# Patient Record
Sex: FEMALE | Race: WHITE | NOT HISPANIC OR LATINO | Employment: OTHER | URBAN - METROPOLITAN AREA
[De-identification: names, ages, dates, MRNs, and addresses within clinical notes are randomized per-mention and may not be internally consistent; named-entity substitution may affect disease eponyms.]

---

## 2023-06-20 ENCOUNTER — OFFICE VISIT (OUTPATIENT)
Dept: URGENT CARE | Facility: CLINIC | Age: 68
End: 2023-06-20
Payer: MEDICARE

## 2023-06-20 VITALS
TEMPERATURE: 97.9 F | HEIGHT: 62 IN | DIASTOLIC BLOOD PRESSURE: 80 MMHG | SYSTOLIC BLOOD PRESSURE: 132 MMHG | HEART RATE: 68 BPM | WEIGHT: 150 LBS | RESPIRATION RATE: 16 BRPM | OXYGEN SATURATION: 98 % | BODY MASS INDEX: 27.6 KG/M2

## 2023-06-20 DIAGNOSIS — M71.22 BAKER'S CYST OF KNEE, LEFT: ICD-10-CM

## 2023-06-20 DIAGNOSIS — M25.462 PAIN AND SWELLING OF LEFT KNEE: Primary | ICD-10-CM

## 2023-06-20 DIAGNOSIS — M25.562 PAIN AND SWELLING OF LEFT KNEE: Primary | ICD-10-CM

## 2023-06-20 PROCEDURE — 99203 OFFICE O/P NEW LOW 30 MIN: CPT | Performed by: PHYSICIAN ASSISTANT

## 2023-06-20 PROCEDURE — G0463 HOSPITAL OUTPT CLINIC VISIT: HCPCS | Performed by: PHYSICIAN ASSISTANT

## 2023-06-20 RX ORDER — MELOXICAM 15 MG/1
15 TABLET ORAL DAILY
Qty: 20 TABLET | Refills: 0 | Status: SHIPPED | OUTPATIENT
Start: 2023-06-20

## 2023-06-20 NOTE — PATIENT INSTRUCTIONS
Take anti-inflammatory as instructed  Take with food  May also take Tylenol in addition if needed  Neoprene knee wrap may be helpful  Would recommend that you apply it earlier in your day to try and help prevent increased pain swelling that has been building throughout the day  You may call the orthopedic scheduling office at [phone] to inquire about scheduling an appointment

## 2023-06-20 NOTE — PROGRESS NOTES
[facility]    NAME: Yenny Short is a 74 y o  female  : 1955    MRN: [id number]  DATE: 2023  TIME: 9:46 AM    Assessment and Plan   Pain and swelling of left knee [M25 562, M25 462]  1  Pain and swelling of left knee  meloxicam (Mobic) 15 mg tablet    Ambulatory referral to Orthopedic Surgery      2  Baker's cyst of knee, left  meloxicam (Mobic) 15 mg tablet    Ambulatory referral to Orthopedic Surgery        Patient informed that this could be degenerative changes of not only the cartilage but the meniscus  Patient deferring x-ray here today as no acute injury  If not improving may benefit from orthopedic evaluation  Patient Instructions   Patient Instructions   Take anti-inflammatory as instructed  Take with food  May also take Tylenol in addition if needed  Neoprene knee wrap may be helpful  Would recommend that you apply it earlier in your day to try and help prevent increased pain swelling that has been building throughout the day  You may call the orthopedic scheduling office at [phone] to inquire about scheduling an appointment  Chief Complaint     Chief Complaint   Patient presents with   • Knee Pain     Patient with left knee pain for 6 days  Lump to the posterior knee as well       History of Present Illness   Yenny Short presents to the clinic c/o  57-year-old female comes in with left knee pain  Started: Couple weeks ago but worse in the last few days  Associated signs and symptoms: Tenderness medial aspect  Feels like she is hyperextended it  Some locking and relief after pops her knee  Also bulging fullness posterior aspect of knee  Modifying factors: Compresses and rest     History of stomach ulcer diagnosed on EGD approximately 10 to 15 years ago  No bleeding  Tries to be careful with what she takes  Currently no primary care provider and is not interested in getting established    Would be interested in orthopedic referral if "needed  Review of Systems   Review of Systems   Constitutional: Positive for activity change  Negative for appetite change, chills and unexpected weight change  Musculoskeletal: Positive for arthralgias, gait problem and joint swelling  Skin: Negative for color change  Current Medications     Long-Term Medications   Medication Sig Dispense Refill   • meloxicam (Mobic) 15 mg tablet Take 1 tablet (15 mg total) by mouth daily 20 tablet 0       Current Allergies     Allergies as of 2023 - Reviewed 2023   Allergen Reaction Noted   • Shellfish-derived products - food allergy Swelling 2012          The following portions of the patient's history were reviewed and updated as appropriate: allergies, current medications, past family history, past medical history, past social history, past surgical history and problem list   History reviewed  No pertinent past medical history  Past Surgical History:   Procedure Laterality Date   •  SECTION       History reviewed  No pertinent family history  Objective   /80   Pulse 68   Temp 97 9 °F (36 6 °C) (Tympanic)   Resp 16   Ht 5' 2\" (1 575 m)   Wt 68 kg (150 lb)   SpO2 98%   BMI 27 44 kg/m²   No LMP recorded  Patient is postmenopausal        Physical Exam     Physical Exam  Vitals and nursing note reviewed  Constitutional:       General: She is not in acute distress  Appearance: Normal appearance  She is well-developed  She is not ill-appearing, toxic-appearing or diaphoretic  Cardiovascular:      Rate and Rhythm: Normal rate  Pulmonary:      Effort: Pulmonary effort is normal  No respiratory distress  Musculoskeletal:         General: Swelling and tenderness present  No deformity or signs of injury  Right knee: Crepitus present  No swelling, deformity, effusion, erythema or bony tenderness  Instability Tests: Anterior drawer test negative  Posterior drawer test negative  Anterior Lachman test negative   " Medial Leana test negative and lateral Leana test negative  Left knee: Effusion, bony tenderness and crepitus present  No deformity, ecchymosis or lacerations  Decreased range of motion  Tenderness present over the medial joint line  No lateral joint line, MCL, LCL, ACL, PCL or patellar tendon tenderness  Abnormal meniscus  Instability Tests: Anterior drawer test negative  Posterior drawer test negative  Anterior Lachman test negative  Medial Leana test positive  Lateral Leana test negative  Neurological:      Mental Status: She is alert and oriented to person, place, and time     Psychiatric:         Mood and Affect: Mood normal          Behavior: Behavior normal

## 2023-07-04 ENCOUNTER — OFFICE VISIT (OUTPATIENT)
Dept: URGENT CARE | Facility: MEDICAL CENTER | Age: 68
End: 2023-07-04
Payer: MEDICARE

## 2023-07-04 VITALS
TEMPERATURE: 100.7 F | HEIGHT: 63 IN | RESPIRATION RATE: 20 BRPM | SYSTOLIC BLOOD PRESSURE: 111 MMHG | WEIGHT: 154 LBS | OXYGEN SATURATION: 100 % | BODY MASS INDEX: 27.29 KG/M2 | HEART RATE: 107 BPM | DIASTOLIC BLOOD PRESSURE: 82 MMHG

## 2023-07-04 DIAGNOSIS — R42 DIZZINESS AND GIDDINESS: ICD-10-CM

## 2023-07-04 DIAGNOSIS — R11.0 NAUSEA: Primary | ICD-10-CM

## 2023-07-04 DIAGNOSIS — R50.9 FEVER, UNSPECIFIED FEVER CAUSE: ICD-10-CM

## 2023-07-04 DIAGNOSIS — M25.50 ARTHRALGIA, UNSPECIFIED JOINT: ICD-10-CM

## 2023-07-04 PROCEDURE — 99213 OFFICE O/P EST LOW 20 MIN: CPT

## 2023-07-04 PROCEDURE — G0463 HOSPITAL OUTPT CLINIC VISIT: HCPCS

## 2023-07-04 RX ORDER — ONDANSETRON 4 MG/1
4 TABLET, FILM COATED ORAL EVERY 8 HOURS PRN
Qty: 20 TABLET | Refills: 0 | Status: SHIPPED | OUTPATIENT
Start: 2023-07-04 | End: 2023-07-05 | Stop reason: ALTCHOICE

## 2023-07-04 NOTE — PROGRESS NOTES
[facility]        NAME: Manny Mckee is a 79 y.o. female  : 1955    MRN: [id number]  DATE: 2023  TIME: 10:20 AM    Assessment and Plan   Nausea [R11.0]  1. Nausea  ondansetron (ZOFRAN) 4 mg tablet      2. Arthralgia, unspecified joint        3. Fever, unspecified fever cause        4. Dizziness and giddiness          No presence of erythema migrans on examination. Constitutional symptoms present. Advised patient that her symptoms are common in a multitude of illnesses/diseases and it is important for her to establish care with a PCP for further workup. PCP handout provided. Zofran prescribed for nausea. Patient Instructions     Your symptoms are common in many illnesses/diseases. It is important to follow up with a PCP for further testing to determine the underlying cause - a PCP handout was provided. For now, treat symptomatically - antipyretics/pain medications (avoid other NSAIDs with meloxicam use, may use Tylenol with meloxicam) to reduce fever and help with the joint pain. Drink plenty of fluids, rest. Follow a bland diet until nausea resolves. Zofran prescribed, use as directed. Proceed to  ER if symptoms worsen. Chief Complaint     Chief Complaint   Patient presents with   • Joint Pain     Pt presents with sharp stabbing pain in all joints starting yesterday. Began with knee pain ~ 1 month ago. Seen at [facility] and given Meloxicam(minimal relief)  Aldos c/o fever, nausea and dizziness. Has hx of lyme's disease X 2 and reports having same sx. Pulled tic off abdomen ~ 1 month ago and reports not having bull's eye rash. History of Present Illness       Patient presents with complaints of joint pain (stabbing pain in all joints in the past 3 days; also left knee pain for approximately one month - seen at [facility] prescribed meloxicam with minimal relief), bilateral hands falling asleep, fever, nausea and dizziness.  Besides meloxicam, has not tried anything else for symptoms. Of note, she states she pulled a tick off of her abdomen about one month ago. Denies rash. States she has had Lyme disease twice in the past with similar symptoms. Review of Systems   Review of Systems   Constitutional: Positive for appetite change (decreased), chills, fatigue and fever (tmax at home 100.5). HENT: Negative for congestion, rhinorrhea and sore throat. Respiratory: Negative for cough. Gastrointestinal: Positive for nausea. Negative for abdominal pain, constipation, diarrhea and vomiting. Musculoskeletal: Positive for arthralgias and joint swelling (left knee). Skin: Negative for rash. Neurological: Positive for dizziness and headaches. Current Medications       Current Outpatient Medications:   •  ondansetron (ZOFRAN) 4 mg tablet, Take 1 tablet (4 mg total) by mouth every 8 (eight) hours as needed for nausea or vomiting, Disp: 20 tablet, Rfl: 0  •  meloxicam (Mobic) 15 mg tablet, Take 1 tablet (15 mg total) by mouth daily, Disp: 20 tablet, Rfl: 0    Current Allergies     Allergies as of 2023 - Reviewed 2023   Allergen Reaction Noted   • Shellfish-derived products - food allergy Swelling 2012            The following portions of the patient's history were reviewed and updated as appropriate: allergies, current medications, past family history, past medical history, past social history, past surgical history and problem list.     History reviewed. No pertinent past medical history. Past Surgical History:   Procedure Laterality Date   •  SECTION         History reviewed. No pertinent family history. Medications have been verified.         Objective   /82 (BP Location: Left arm, Patient Position: Sitting, Cuff Size: Standard)   Pulse (!) 107   Temp (!) 100.7 °F (38.2 °C) (Tympanic)   Resp 20   Ht 5' 3" (1.6 m)   Wt 69.9 kg (154 lb)   SpO2 100%   BMI 27.28 kg/m²        Physical Exam     Physical Exam  Vitals and nursing note reviewed. Constitutional:       General: She is not in acute distress. Appearance: Normal appearance. She is not ill-appearing. Cardiovascular:      Rate and Rhythm: Normal rate and regular rhythm. Pulses: Normal pulses. Heart sounds: Normal heart sounds. Pulmonary:      Effort: Pulmonary effort is normal.      Breath sounds: Normal breath sounds. Abdominal:      General: Abdomen is flat. Bowel sounds are normal. There is no distension. Palpations: Abdomen is soft. Tenderness: There is no abdominal tenderness. There is no guarding. Musculoskeletal:      Right shoulder: No swelling, deformity, tenderness or bony tenderness. Normal range of motion. Left shoulder: No swelling, deformity, tenderness or bony tenderness. Normal range of motion. Right wrist: No swelling, deformity, tenderness or bony tenderness. Normal range of motion. Normal pulse. Left wrist: No swelling, deformity, tenderness or bony tenderness. Normal range of motion. Normal pulse. Right knee: No swelling, deformity or bony tenderness. Normal range of motion. No tenderness. Left knee: Swelling present. No deformity or bony tenderness. Normal range of motion. No tenderness. Comments: Negative Tinel's, Phalen's. Skin:     General: Skin is warm and dry. Findings: No erythema or rash. Neurological:      Mental Status: She is alert.

## 2023-07-04 NOTE — PATIENT INSTRUCTIONS
Your symptoms are common in many illnesses/diseases. It is important to follow up with a PCP for further testing to determine the underlying cause - a PCP handout was provided. For now, treat symptomatically - antipyretics/pain medications (avoid other NSAIDs with meloxicam use, may use Tylenol with meloxicam) to reduce fever and help with the joint pain. Drink plenty of fluids, rest. Follow a bland diet until nausea resolves. Zofran prescribed, use as directed. Proceed to  ER if symptoms worsen.

## 2023-07-05 ENCOUNTER — HOSPITAL ENCOUNTER (EMERGENCY)
Facility: HOSPITAL | Age: 68
Discharge: HOME | End: 2023-07-05
Attending: EMERGENCY MEDICINE
Payer: MEDICARE

## 2023-07-05 VITALS
TEMPERATURE: 99.8 F | RESPIRATION RATE: 18 BRPM | HEART RATE: 83 BPM | OXYGEN SATURATION: 98 % | DIASTOLIC BLOOD PRESSURE: 86 MMHG | SYSTOLIC BLOOD PRESSURE: 133 MMHG | BODY MASS INDEX: 27.3 KG/M2 | WEIGHT: 154.1 LBS

## 2023-07-05 DIAGNOSIS — D69.6 THROMBOCYTOPENIA (HCC): ICD-10-CM

## 2023-07-05 DIAGNOSIS — R11.0 NAUSEA: ICD-10-CM

## 2023-07-05 DIAGNOSIS — E87.1 HYPONATREMIA: ICD-10-CM

## 2023-07-05 DIAGNOSIS — M79.10 MYALGIA: Primary | ICD-10-CM

## 2023-07-05 DIAGNOSIS — M25.50 JOINT PAIN: ICD-10-CM

## 2023-07-05 DIAGNOSIS — R51.9 HEADACHE: ICD-10-CM

## 2023-07-05 LAB
ALBUMIN SERPL BCP-MCNC: 4.1 G/DL (ref 3.5–5)
ALP SERPL-CCNC: 55 U/L (ref 34–104)
ALT SERPL W P-5'-P-CCNC: 21 U/L (ref 7–52)
ANION GAP SERPL CALCULATED.3IONS-SCNC: 9 MMOL/L
AST SERPL W P-5'-P-CCNC: 44 U/L (ref 13–39)
ATRIAL RATE: 94 BPM
B BURGDOR IGG+IGM SER-ACNC: >8 AI
BASOPHILS # BLD MANUAL: 0 THOUSAND/UL (ref 0–0.1)
BASOPHILS NFR MAR MANUAL: 0 % (ref 0–1)
BILIRUB SERPL-MCNC: 0.58 MG/DL (ref 0.2–1)
BUN SERPL-MCNC: 17 MG/DL (ref 5–25)
CALCIUM SERPL-MCNC: 8.9 MG/DL (ref 8.4–10.2)
CARDIAC TROPONIN I PNL SERPL HS: 5 NG/L
CHLORIDE SERPL-SCNC: 95 MMOL/L (ref 96–108)
CO2 SERPL-SCNC: 23 MMOL/L (ref 21–32)
CREAT SERPL-MCNC: 0.78 MG/DL (ref 0.6–1.3)
EOSINOPHIL # BLD MANUAL: 0 THOUSAND/UL (ref 0–0.4)
EOSINOPHIL NFR BLD MANUAL: 0 % (ref 0–6)
ERYTHROCYTE [DISTWIDTH] IN BLOOD BY AUTOMATED COUNT: 13.1 % (ref 11.6–15.1)
GFR SERPL CREATININE-BSD FRML MDRD: 79 ML/MIN/1.73SQ M
GLUCOSE SERPL-MCNC: 88 MG/DL (ref 65–140)
HCT VFR BLD AUTO: 41.7 % (ref 34.8–46.1)
HGB BLD-MCNC: 13.6 G/DL (ref 11.5–15.4)
LYMPHOCYTES # BLD AUTO: 0.38 THOUSAND/UL (ref 0.6–4.47)
LYMPHOCYTES # BLD AUTO: 12 % (ref 14–44)
MCH RBC QN AUTO: 28.9 PG (ref 26.8–34.3)
MCHC RBC AUTO-ENTMCNC: 32.6 G/DL (ref 31.4–37.4)
MCV RBC AUTO: 89 FL (ref 82–98)
MONOCYTES # BLD AUTO: 0.1 THOUSAND/UL (ref 0–1.22)
MONOCYTES NFR BLD: 3 % (ref 4–12)
NEUTROPHILS # BLD MANUAL: 2.69 THOUSAND/UL (ref 1.85–7.62)
NEUTS BAND NFR BLD MANUAL: 16 % (ref 0–8)
NEUTS SEG NFR BLD AUTO: 69 % (ref 43–75)
P AXIS: 73 DEGREES
PLATELET # BLD AUTO: 73 THOUSANDS/UL (ref 149–390)
PLATELET BLD QL SMEAR: ABNORMAL
PMV BLD AUTO: 8.7 FL (ref 8.9–12.7)
POTASSIUM SERPL-SCNC: 3.5 MMOL/L (ref 3.5–5.3)
PR INTERVAL: 154 MS
PROT SERPL-MCNC: 7.1 G/DL (ref 6.4–8.4)
QRS AXIS: 7 DEGREES
QRSD INTERVAL: 88 MS
QT INTERVAL: 352 MS
QTC INTERVAL: 440 MS
RBC # BLD AUTO: 4.7 MILLION/UL (ref 3.81–5.12)
RBC MORPH BLD: NORMAL
SODIUM SERPL-SCNC: 127 MMOL/L (ref 135–147)
T WAVE AXIS: 63 DEGREES
VENTRICULAR RATE: 94 BPM
WBC # BLD AUTO: 3.17 THOUSAND/UL (ref 4.31–10.16)

## 2023-07-05 PROCEDURE — 85027 COMPLETE CBC AUTOMATED: CPT | Performed by: PHYSICIAN ASSISTANT

## 2023-07-05 PROCEDURE — 86617 LYME DISEASE ANTIBODY: CPT | Performed by: PHYSICIAN ASSISTANT

## 2023-07-05 PROCEDURE — 36415 COLL VENOUS BLD VENIPUNCTURE: CPT | Performed by: PHYSICIAN ASSISTANT

## 2023-07-05 PROCEDURE — 85007 BL SMEAR W/DIFF WBC COUNT: CPT | Performed by: PHYSICIAN ASSISTANT

## 2023-07-05 PROCEDURE — 93005 ELECTROCARDIOGRAM TRACING: CPT

## 2023-07-05 PROCEDURE — 80053 COMPREHEN METABOLIC PANEL: CPT | Performed by: PHYSICIAN ASSISTANT

## 2023-07-05 PROCEDURE — 93010 ELECTROCARDIOGRAM REPORT: CPT

## 2023-07-05 PROCEDURE — 84484 ASSAY OF TROPONIN QUANT: CPT | Performed by: PHYSICIAN ASSISTANT

## 2023-07-05 PROCEDURE — 86618 LYME DISEASE ANTIBODY: CPT | Performed by: PHYSICIAN ASSISTANT

## 2023-07-05 RX ORDER — SENNOSIDES 8.6 MG
650 CAPSULE ORAL EVERY 8 HOURS PRN
Qty: 30 TABLET | Refills: 0 | Status: SHIPPED | OUTPATIENT
Start: 2023-07-05

## 2023-07-05 RX ORDER — KETOROLAC TROMETHAMINE 30 MG/ML
15 INJECTION, SOLUTION INTRAMUSCULAR; INTRAVENOUS ONCE
Status: COMPLETED | OUTPATIENT
Start: 2023-07-05 | End: 2023-07-05

## 2023-07-05 RX ORDER — METOCLOPRAMIDE HYDROCHLORIDE 5 MG/ML
10 INJECTION INTRAMUSCULAR; INTRAVENOUS ONCE
Status: COMPLETED | OUTPATIENT
Start: 2023-07-05 | End: 2023-07-05

## 2023-07-05 RX ORDER — DIPHENHYDRAMINE HYDROCHLORIDE 50 MG/ML
25 INJECTION INTRAMUSCULAR; INTRAVENOUS ONCE
Status: COMPLETED | OUTPATIENT
Start: 2023-07-05 | End: 2023-07-05

## 2023-07-05 RX ORDER — DOXYCYCLINE HYCLATE 100 MG/1
100 CAPSULE ORAL 2 TIMES DAILY
Qty: 42 CAPSULE | Refills: 0 | Status: SHIPPED | OUTPATIENT
Start: 2023-07-05 | End: 2023-07-26

## 2023-07-05 RX ADMIN — DIPHENHYDRAMINE HYDROCHLORIDE 25 MG: 50 INJECTION, SOLUTION INTRAMUSCULAR; INTRAVENOUS at 11:43

## 2023-07-05 RX ADMIN — KETOROLAC TROMETHAMINE 15 MG: 30 INJECTION, SOLUTION INTRAMUSCULAR; INTRAVENOUS at 11:42

## 2023-07-05 RX ADMIN — SODIUM CHLORIDE 1000 ML: 0.9 INJECTION, SOLUTION INTRAVENOUS at 11:42

## 2023-07-05 RX ADMIN — METOCLOPRAMIDE 10 MG: 5 INJECTION, SOLUTION INTRAMUSCULAR; INTRAVENOUS at 11:43

## 2023-07-05 NOTE — DISCHARGE INSTRUCTIONS
Please refer to the attached information for strict return instructions. If symptoms worsen or new symptoms develop please return to the ER. Please follow up with your primary care physician for re-evaluation of symptom. Use prescribed antibiotics for full course pending changes by primary care physician.

## 2023-07-05 NOTE — ED PROVIDER NOTES
History  Chief Complaint   Patient presents with   • Generalized Body Aches     Pt c/o generalized body aches with headache nausea. Pt denies cp/sob/v/d. Pt is c/o dizziness. Pt states she has a Hx of lymes disease     Fahad Mendez is a 80 yo F presenting with waxing/waning joint paints over the past 2-3 weeks. Reports it began with pain in L knee and slight swelling - seen at urgent care, give meloxicam. She subsequently developed pain in R knee a few days later. The knee pain has since resolved. She did however develop pain in her hips bilaterally. She notes in the past week she developed generalized body aches, fatigue, nausea without vomiting, and headache. Low grade fever noted. The headache has been constant since onset, generalized, moderate in intensity. No neck pain/stiffness. Does report light sensitivity. She reports she had lyme disease about 20 years ago with similar symptoms. Reports living in the woods and spending a lot of time outdoors in the past 1-2 months. She believes she may have had a tick bite to R side of abdomen prior to onset of symptoms, although did not recall seeing tick specifically. No bulls-eye rash noted. History provided by:  Patient   used: No        None       Past Medical History:   Diagnosis Date   • Lyme disease        Past Surgical History:   Procedure Laterality Date   •  SECTION         History reviewed. No pertinent family history. I have reviewed and agree with the history as documented. E-Cigarette/Vaping   • E-Cigarette Use Never User      E-Cigarette/Vaping Substances     Social History     Tobacco Use   • Smoking status: Former     Types: Cigarettes   • Smokeless tobacco: Never   Vaping Use   • Vaping Use: Never used   Substance Use Topics   • Alcohol use: Yes     Comment: soc   • Drug use: Never       Review of Systems   Constitutional: Positive for fatigue and fever. Negative for chills.    HENT: Negative for congestion, rhinorrhea and sore throat. Eyes: Negative for pain and visual disturbance. Respiratory: Negative for cough, shortness of breath and wheezing. Cardiovascular: Negative for chest pain and palpitations. Gastrointestinal: Positive for nausea. Negative for abdominal pain, constipation, diarrhea and vomiting. Genitourinary: Negative for dysuria, frequency and urgency. Musculoskeletal: Positive for arthralgias and myalgias. Negative for back pain, neck pain and neck stiffness. Skin: Negative for rash and wound. Neurological: Positive for light-headedness and headaches. Negative for dizziness, weakness and numbness. Physical Exam  Physical Exam  Constitutional:       General: She is not in acute distress. Appearance: She is well-developed. She is not toxic-appearing or diaphoretic. HENT:      Head: Normocephalic and atraumatic. Right Ear: External ear normal.      Left Ear: External ear normal.   Eyes:      Conjunctiva/sclera: Conjunctivae normal.      Pupils: Pupils are equal, round, and reactive to light. Cardiovascular:      Rate and Rhythm: Normal rate and regular rhythm. Heart sounds: Normal heart sounds. No murmur heard. No friction rub. No gallop. Pulmonary:      Effort: Pulmonary effort is normal. No respiratory distress. Breath sounds: Normal breath sounds. No wheezing. Abdominal:      General: There is no distension. Palpations: Abdomen is soft. Tenderness: There is no abdominal tenderness. Musculoskeletal:      Cervical back: Normal range of motion and neck supple. Comments: Normal ROM bilateral upper and lower extremities without significant pain. No appreciable joint effusions or erythema/warmth. Normal ROM c-spine, no nuchal rigidity. Lymphadenopathy:      Cervical: No cervical adenopathy. Skin:     General: Skin is warm and dry. Capillary Refill: Capillary refill takes less than 2 seconds. Findings: No erythema or rash.    Neurological: Mental Status: She is alert and oriented to person, place, and time. Motor: No abnormal muscle tone. Coordination: Coordination normal.   Psychiatric:         Behavior: Behavior normal.         Thought Content:  Thought content normal.         Judgment: Judgment normal.         Vital Signs  ED Triage Vitals [07/05/23 1038]   Temperature Pulse Respirations Blood Pressure SpO2   99.8 °F (37.7 °C) (!) 110 18 142/83 99 %      Temp Source Heart Rate Source Patient Position - Orthostatic VS BP Location FiO2 (%)   Oral Monitor Lying Right arm --      Pain Score       8           Vitals:    07/05/23 1038 07/05/23 1334   BP: 142/83 133/86   Pulse: (!) 110 83   Patient Position - Orthostatic VS: Lying Lying         Visual Acuity  Visual Acuity    Flowsheet Row Most Recent Value   L Pupil Size (mm) 3   R Pupil Size (mm) 3          ED Medications  Medications   ketorolac (TORADOL) injection 15 mg (15 mg Intravenous Given 7/5/23 1142)   metoclopramide (REGLAN) injection 10 mg (10 mg Intravenous Given 7/5/23 1143)   diphenhydrAMINE (BENADRYL) injection 25 mg (25 mg Intravenous Given 7/5/23 1143)   sodium chloride 0.9 % bolus 1,000 mL (0 mL Intravenous Stopped 7/5/23 1251)       Diagnostic Studies  Results Reviewed     Procedure Component Value Units Date/Time    Manual Differential(PHLEBS Do Not Order) [[id number]]  (Abnormal) Collected: 07/05/23 1149    Lab Status: Final result Specimen: Blood from Arm, Left Updated: 07/05/23 1247     Segmented % 69 %      Bands % 16 %      Lymphocytes % 12 %      Monocytes % 3 %      Eosinophils, % 0 %      Basophils % 0 %      Absolute Neutrophils 2.69 Thousand/uL      Lymphocytes Absolute 0.38 Thousand/uL      Monocytes Absolute 0.10 Thousand/uL      Eosinophils Absolute 0.00 Thousand/uL      Basophils Absolute 0.00 Thousand/uL      Total Counted --     RBC Morphology Normal     Platelet Estimate Decreased    HS Troponin 0hr (reflex protocol) [[id number]]  (Normal) Collected: 07/05/23 1149    Lab Status: Final result Specimen: Blood from Arm, Left Updated: 07/05/23 1233     hs TnI 0hr 5 ng/L     Comprehensive metabolic panel [[id number]]  (Abnormal) Collected: 07/05/23 1149    Lab Status: Final result Specimen: Blood from Arm, Left Updated: 07/05/23 1224     Sodium 127 mmol/L      Potassium 3.5 mmol/L      Chloride 95 mmol/L      CO2 23 mmol/L      ANION GAP 9 mmol/L      BUN 17 mg/dL      Creatinine 0.78 mg/dL      Glucose 88 mg/dL      Calcium 8.9 mg/dL      AST 44 U/L      ALT 21 U/L      Alkaline Phosphatase 55 U/L      Total Protein 7.1 g/dL      Albumin 4.1 g/dL      Total Bilirubin 0.58 mg/dL      eGFR 79 ml/min/1.73sq m     Narrative:      [place] guidelines for Chronic Kidney Disease (CKD):   •  Stage 1 with normal or high GFR (GFR > 90 mL/min/1.73 square meters)  •  Stage 2 Mild CKD (GFR = 60-89 mL/min/1.73 square meters)  •  Stage 3A Moderate CKD (GFR = 45-59 mL/min/1.73 square meters)  •  Stage 3B Moderate CKD (GFR = 30-44 mL/min/1.73 square meters)  •  Stage 4 Severe CKD (GFR = 15-29 mL/min/1.73 square meters)  •  Stage 5 End Stage CKD (GFR <15 mL/min/1.73 square meters)  Note: GFR calculation is accurate only with a steady state creatinine    CBC and differential [[id number]]  (Abnormal) Collected: 07/05/23 1149    Lab Status: Final result Specimen: Blood from Arm, Left Updated: 07/05/23 1213     WBC 3.17 Thousand/uL      RBC 4.70 Million/uL      Hemoglobin 13.6 g/dL      Hematocrit 41.7 %      MCV 89 fL      MCH 28.9 pg      MCHC 32.6 g/dL      RDW 13.1 %      MPV 8.7 fL      Platelets 73 Thousands/uL     Lyme Antibody Profile with reflex to WB [[id number]] Collected: 07/05/23 1149    Lab Status:  In process Specimen: Blood from Arm, Left Updated: 07/05/23 1157                 No orders to display              Procedures  ECG 12 Lead Documentation Only    Date/Time: 7/5/2023 10:50 AM    Performed by: Declan Mejias PA-C  Authorized by: Shruthi Evans Lisha Jackson PA-C    Indications / Diagnosis:  Weakness  ECG reviewed by me, the ED Provider: yes    Patient location:  ED  Previous ECG:     Previous ECG:  Unavailable    Comparison to cardiac monitor: Yes    Interpretation:     Interpretation: non-specific    Rate:     ECG rate:  94    ECG rate assessment: normal    Rhythm:     Rhythm: sinus rhythm    Ectopy:     Ectopy: none    QRS:     QRS axis:  Normal    QRS intervals:  Normal  Conduction:     Conduction: normal    ST segments:     ST segments:  Normal  T waves:     T waves: normal    Other findings:     Other findings comment:  Low voltage QRS, ? CHERELLE             ED Course                                             Medical Decision Making  Pt presenting with initially arthralgias to knees, subsequently additional arthralgias, and now with myalgias, low grade fevers, nausea, headaches, fatigue. Does report living in woods with suspected preceding tick bite. Strong suspicion for Lyme disease clinically. Lab workup here reveals hyponatremia to 127, given IV fluid bolus of normal saline here. Thrombocytopenia to 73 noted - no previous to compare. Bandemia also noted. She has temp of 99.8 here, not febrile but elevated. She is nontoxic. No other localizing symptoms including cough/congestion, urinary symptoms, rashes to suggest alternate sources of infection. Given toradol for headache/myalgias, reglan for nausea with benadryl with improvement in headache and nausea. Will discharge with 21 day course of doxycycline with Lyme panel pending. She is advised to call PCP today or tomorrow to arrange follow up concerning low sodium and platelet count, as well as coordination of antibiotic pending testing - she verbalizes understanding. Strict return to ED indications reviewed. Amount and/or Complexity of Data Reviewed  Labs: ordered. Risk  OTC drugs. Prescription drug management.           Disposition  Final diagnoses:   Myalgia   Joint pain   Nausea Headache   Hyponatremia   Thrombocytopenia ([address])     Time reflects when diagnosis was documented in both MDM as applicable and the Disposition within this note     Time User Action Codes Description Comment    7/5/2023  2:25 PM Mejia Lomax Add [M79.10] Myalgia     7/5/2023  2:25 PM Mejia Lomax Add [M25.50] Joint pain     7/5/2023  2:25 PM Benay Pee [R11.0] Nausea     7/5/2023  2:25 PM Benay Pee [R51.9] Headache     7/5/2023  2:29 PM Benay Pee [E87.1] Hyponatremia     7/5/2023  2:29 PM Mejia Lomax Add [D69.6] Thrombocytopenia [facility])       ED Disposition     ED Disposition   Discharge    Condition   Stable    Date/Time   Wed Jul 5, 2023  2:25 PM    [facility] discharge to home/self care. Follow-up Information     Follow up With Specialties Details Why [facility] Emergency [facility]  If symptoms worsen [address] [id number]  [facility], [address] [place] [address], [id number]          Discharge Medication List as of 7/5/2023  2:29 PM      START taking these medications    Details   acetaminophen (TYLENOL) 650 mg CR tablet Take 1 tablet (650 mg total) by mouth every 8 (eight) hours as needed for mild pain or moderate pain, Starting Wed 7/5/2023, Normal      Diclofenac Sodium (VOLTAREN) 1 % Apply 2 g topically 4 (four) times a day as needed (Joint pain), Starting Wed 7/5/2023, Normal      doxycycline hyclate (VIBRAMYCIN) 100 mg capsule Take 1 capsule (100 mg total) by mouth 2 (two) times a day for 21 days, Starting Wed 7/5/2023, Until Wed 7/26/2023, Normal             No discharge procedures on file.     PDMP Review     None          ED Provider  Electronically Signed by           Ailyn Chan PA-C  07/05/23 9701

## 2023-07-07 LAB
B BURGDOR IGG PATRN SER IB-IMP: NEGATIVE
B BURGDOR IGM PATRN SER IB-IMP: NEGATIVE
B BURGDOR18KD IGG SER QL IB: ABNORMAL
B BURGDOR23KD IGG SER QL IB: ABNORMAL
B BURGDOR23KD IGM SER QL IB: PRESENT
B BURGDOR28KD IGG SER QL IB: ABNORMAL
B BURGDOR30KD IGG SER QL IB: ABNORMAL
B BURGDOR39KD IGG SER QL IB: PRESENT
B BURGDOR39KD IGM SER QL IB: ABNORMAL
B BURGDOR41KD IGG SER QL IB: PRESENT
B BURGDOR41KD IGM SER QL IB: ABNORMAL
B BURGDOR45KD IGG SER QL IB: ABNORMAL
B BURGDOR58KD IGG SER QL IB: PRESENT
B BURGDOR66KD IGG SER QL IB: ABNORMAL
B BURGDOR93KD IGG SER QL IB: ABNORMAL

## 2023-07-12 ENCOUNTER — OFFICE VISIT (OUTPATIENT)
Dept: FAMILY MEDICINE CLINIC | Facility: CLINIC | Age: 68
End: 2023-07-12
Payer: MEDICARE

## 2023-07-12 ENCOUNTER — APPOINTMENT (OUTPATIENT)
Dept: RADIOLOGY | Facility: CLINIC | Age: 68
End: 2023-07-12
Payer: MEDICARE

## 2023-07-12 VITALS
SYSTOLIC BLOOD PRESSURE: 106 MMHG | HEIGHT: 63 IN | OXYGEN SATURATION: 97 % | DIASTOLIC BLOOD PRESSURE: 78 MMHG | WEIGHT: 153 LBS | RESPIRATION RATE: 16 BRPM | HEART RATE: 82 BPM | TEMPERATURE: 97.3 F | BODY MASS INDEX: 27.11 KG/M2

## 2023-07-12 DIAGNOSIS — M79.10 MYALGIA: ICD-10-CM

## 2023-07-12 DIAGNOSIS — M25.562 ACUTE PAIN OF LEFT KNEE: Primary | ICD-10-CM

## 2023-07-12 DIAGNOSIS — M25.562 ACUTE PAIN OF LEFT KNEE: ICD-10-CM

## 2023-07-12 DIAGNOSIS — R68.89 FLU-LIKE SYMPTOMS: ICD-10-CM

## 2023-07-12 PROCEDURE — 73564 X-RAY EXAM KNEE 4 OR MORE: CPT

## 2023-07-12 PROCEDURE — 99204 OFFICE O/P NEW MOD 45 MIN: CPT | Performed by: FAMILY MEDICINE

## 2023-07-12 NOTE — PROGRESS NOTES
Assessment/Plan:   1. Acute pain of left knee/Flu-like symptoms/Myalgia  Reviewed patient's symptoms today. Some, it is unclear as to exact cause of her symptoms. Symptoms appear likely infectious. Reviewed her ED record as well as her blood work. She has had Lyme disease in the past.  She has noticed a moderate improvement in her symptoms after 2 days of doxycycline. At this time, we will continue with her doxycycline. Check x-ray of her knee to rule out any gross orthopedic abnormalities. We will check CBC and CMP to reassess her thrombocytopenia as well as her hyponatremia. - XR knee 4+ vw left injury; Future               There are no diagnoses linked to this encounter. Subjective:       Chief Complaint   Patient presents with   • Follow-up     ER visit      Patient ID: Lula Vicente is a 79 y.o. female presents today as a new patient to establish care. She is following up from recent ED visit. She was seen in the ED on July 5. She is seen secondary to development of significant fatigue as well as knee pain. She did have an evaluation. Her symptoms started prior in May. She denies any tick bites. She did have testing completed which initially showed positivity for Lyme disease. She was started on treatment with doxycycline. Since her discharge, she has had moderate improvement in her symptoms. She states is a 75% improvement over the past week. HPI    Review of Systems   Constitutional: Negative for activity change, chills, fatigue and fever. HENT: Negative for congestion, ear pain, sinus pressure and sore throat. Eyes: Negative for redness, itching and visual disturbance. Respiratory: Negative for cough and shortness of breath. Cardiovascular: Negative for chest pain and palpitations. Gastrointestinal: Negative for abdominal pain, diarrhea and nausea. Endocrine: Negative for cold intolerance and heat intolerance.    Genitourinary: Negative for dysuria, flank pain and frequency. Musculoskeletal: Negative for arthralgias, back pain, gait problem and myalgias. Skin: Negative for color change. Allergic/Immunologic: Negative for environmental allergies. Neurological: Negative for dizziness, numbness and headaches. Psychiatric/Behavioral: Negative for behavioral problems and sleep disturbance. The following portions of the patient's history were reviewed and updated as appropriate : past family history, past medical history, past social history and past surgical history.     Current Outpatient Medications:   •  acetaminophen (TYLENOL) 650 mg CR tablet, Take 1 tablet (650 mg total) by mouth every 8 (eight) hours as needed for mild pain or moderate pain, Disp: 30 tablet, Rfl: 0  •  Diclofenac Sodium (VOLTAREN) 1 %, Apply 2 g topically 4 (four) times a day as needed (Joint pain), Disp: 100 g, Rfl: 0  •  doxycycline hyclate (VIBRAMYCIN) 100 mg capsule, Take 1 capsule (100 mg total) by mouth 2 (two) times a day for 21 days, Disp: 42 capsule, Rfl: 0         Objective:         Vitals:    07/12/23 1214   BP: 106/78   BP Location: Right arm   Patient Position: Sitting   Cuff Size: Standard   Pulse: 82   Resp: 16   Temp: (!) 97.3 °F (36.3 °C)   TempSrc: Temporal   SpO2: 97%   Weight: 69.4 kg (153 lb)   Height: 5' 3" (1.6 m)     Physical Exam

## 2023-07-19 ENCOUNTER — APPOINTMENT (OUTPATIENT)
Dept: LAB | Facility: CLINIC | Age: 68
End: 2023-07-19
Payer: MEDICARE

## 2023-07-19 DIAGNOSIS — R68.89 FLU-LIKE SYMPTOMS: ICD-10-CM

## 2023-07-19 DIAGNOSIS — M79.10 MYALGIA: ICD-10-CM

## 2023-07-19 LAB
ALBUMIN SERPL BCP-MCNC: 3.6 G/DL (ref 3.5–5)
ALP SERPL-CCNC: 67 U/L (ref 46–116)
ALT SERPL W P-5'-P-CCNC: 23 U/L (ref 12–78)
ANION GAP SERPL CALCULATED.3IONS-SCNC: 1 MMOL/L
AST SERPL W P-5'-P-CCNC: 21 U/L (ref 5–45)
BASOPHILS # BLD AUTO: 0.08 THOUSANDS/ÂΜL (ref 0–0.1)
BASOPHILS NFR BLD AUTO: 1 % (ref 0–1)
BILIRUB SERPL-MCNC: 0.67 MG/DL (ref 0.2–1)
BUN SERPL-MCNC: 16 MG/DL (ref 5–25)
CALCIUM SERPL-MCNC: 9.4 MG/DL (ref 8.3–10.1)
CHLORIDE SERPL-SCNC: 113 MMOL/L (ref 96–108)
CO2 SERPL-SCNC: 28 MMOL/L (ref 21–32)
CREAT SERPL-MCNC: 0.65 MG/DL (ref 0.6–1.3)
EOSINOPHIL # BLD AUTO: 0.22 THOUSAND/ÂΜL (ref 0–0.61)
EOSINOPHIL NFR BLD AUTO: 3 % (ref 0–6)
ERYTHROCYTE [DISTWIDTH] IN BLOOD BY AUTOMATED COUNT: 13.5 % (ref 11.6–15.1)
GFR SERPL CREATININE-BSD FRML MDRD: 92 ML/MIN/1.73SQ M
GLUCOSE P FAST SERPL-MCNC: 113 MG/DL (ref 65–99)
HCT VFR BLD AUTO: 37.8 % (ref 34.8–46.1)
HGB BLD-MCNC: 12.2 G/DL (ref 11.5–15.4)
IMM GRANULOCYTES # BLD AUTO: 0.02 THOUSAND/UL (ref 0–0.2)
IMM GRANULOCYTES NFR BLD AUTO: 0 % (ref 0–2)
LYMPHOCYTES # BLD AUTO: 2.71 THOUSANDS/ÂΜL (ref 0.6–4.47)
LYMPHOCYTES NFR BLD AUTO: 34 % (ref 14–44)
MCH RBC QN AUTO: 29.5 PG (ref 26.8–34.3)
MCHC RBC AUTO-ENTMCNC: 32.3 G/DL (ref 31.4–37.4)
MCV RBC AUTO: 92 FL (ref 82–98)
MONOCYTES # BLD AUTO: 0.91 THOUSAND/ÂΜL (ref 0.17–1.22)
MONOCYTES NFR BLD AUTO: 11 % (ref 4–12)
NEUTROPHILS # BLD AUTO: 4.04 THOUSANDS/ÂΜL (ref 1.85–7.62)
NEUTS SEG NFR BLD AUTO: 51 % (ref 43–75)
NRBC BLD AUTO-RTO: 0 /100 WBCS
PLATELET # BLD AUTO: 457 THOUSANDS/UL (ref 149–390)
PMV BLD AUTO: 8.2 FL (ref 8.9–12.7)
POTASSIUM SERPL-SCNC: 4.2 MMOL/L (ref 3.5–5.3)
PROT SERPL-MCNC: 7.5 G/DL (ref 6.4–8.4)
RBC # BLD AUTO: 4.13 MILLION/UL (ref 3.81–5.12)
SODIUM SERPL-SCNC: 142 MMOL/L (ref 135–147)
WBC # BLD AUTO: 7.98 THOUSAND/UL (ref 4.31–10.16)

## 2023-07-19 PROCEDURE — 85025 COMPLETE CBC W/AUTO DIFF WBC: CPT

## 2023-07-19 PROCEDURE — 80053 COMPREHEN METABOLIC PANEL: CPT

## 2023-07-19 PROCEDURE — 36415 COLL VENOUS BLD VENIPUNCTURE: CPT

## 2023-08-07 ENCOUNTER — OFFICE VISIT (OUTPATIENT)
Dept: OBGYN CLINIC | Facility: MEDICAL CENTER | Age: 68
End: 2023-08-07
Payer: MEDICARE

## 2023-08-07 VITALS
HEART RATE: 65 BPM | HEIGHT: 63 IN | BODY MASS INDEX: 27.04 KG/M2 | SYSTOLIC BLOOD PRESSURE: 116 MMHG | WEIGHT: 152.6 LBS | DIASTOLIC BLOOD PRESSURE: 77 MMHG

## 2023-08-07 DIAGNOSIS — M71.22 BAKER'S CYST OF KNEE, LEFT: ICD-10-CM

## 2023-08-07 DIAGNOSIS — M25.562 PAIN AND SWELLING OF LEFT KNEE: ICD-10-CM

## 2023-08-07 DIAGNOSIS — M25.462 PAIN AND SWELLING OF LEFT KNEE: ICD-10-CM

## 2023-08-07 PROCEDURE — 99203 OFFICE O/P NEW LOW 30 MIN: CPT | Performed by: ORTHOPAEDIC SURGERY

## 2023-08-07 NOTE — PROGRESS NOTES
Ortho Sports Medicine Knee Visit     Assesment:     left knee Possible Medial Meniscus tear    Plan:    Conservative treatment:    Ice to knee for 20 minutes at least 1-2 times daily. PT for ROM/strengthening to knee, hip and core. OTC NSAIDS prn for pain. MRI ordered      Imaging: All imaging from today was reviewed by myself and explained to the patient. Injection:    No Injection planned at this time. May consider future corticosteroid injection depending on clinical exam/imaging. Surgery:     No surgery is recommended at this point, continue with conservative treatment plan as noted. History of Present Illness: The patient is a 79 y.o. female  referred to me by their primary care physician, seen in clinic for consultation of left knee pain. Pt cleans houses for a living    Pain is located anterior, posterior. Pt states that around Mother's Day she was walking when she felt like her knee hyperextended. She had pain and swelling in her knee. She wore a brace and it got better. Now she reports increased pain. There is pressure in the back of her knee and she cannot bend her knee fully. Pain is noted lateral and medial knee.  She has pain with steps and ambulation  No treatments  No past surgery        She does have past Lyme infection    Knee Surgical History:  None    Past Medical, Social and Family History:  Past Medical History:   Diagnosis Date   • Lyme disease      Past Surgical History:   Procedure Laterality Date   •  SECTION       Allergies   Allergen Reactions   • Shellfish-Derived Products - Food Allergy Swelling     Current Outpatient Medications on File Prior to Visit   Medication Sig Dispense Refill   • Diclofenac Sodium (VOLTAREN) 1 % Apply 2 g topically 4 (four) times a day as needed (Joint pain) 100 g 0   • acetaminophen (TYLENOL) 650 mg CR tablet Take 1 tablet (650 mg total) by mouth every 8 (eight) hours as needed for mild pain or moderate pain (Patient not taking: Reported on 8/7/2023) 30 tablet 0     No current facility-administered medications on file prior to visit. Social History     Socioeconomic History   • Marital status: /Civil Union     Spouse name: Not on file   • Number of children: Not on file   • Years of education: Not on file   • Highest education level: Not on file   Occupational History   • Not on file   Tobacco Use   • Smoking status: Former     Types: Cigarettes   • Smokeless tobacco: Never   Vaping Use   • Vaping Use: Never used   Substance and Sexual Activity   • Alcohol use: Yes     Comment: soc   • Drug use: Never   • Sexual activity: Not on file   Other Topics Concern   • Not on file   Social History Narrative   • Not on file     Social Determinants of Health     Financial Resource Strain: Not on file   Food Insecurity: Not on file   Transportation Needs: Not on file   Physical Activity: Not on file   Stress: Not on file   Social Connections: Not on file   Intimate Partner Violence: Not on file   Housing Stability: Not on file         I have reviewed the past medical, surgical, social and family history, medications and allergies as documented in the EMR. Review of systems: ROS is negative other than that noted in the HPI. Constitutional: Negative for fatigue and fever. HENT: Negative for sore throat. Respiratory: Negative for shortness of breath. Cardiovascular: Negative for chest pain. Gastrointestinal: Negative for abdominal pain. Endocrine: Negative for cold intolerance and heat intolerance. Genitourinary: Negative for flank pain. Musculoskeletal: Negative for back pain. Skin: Negative for rash. Allergic/Immunologic: Negative for immunocompromised state. Neurological: Negative for dizziness. Psychiatric/Behavioral: Negative for agitation. Physical Exam:    Blood pressure 116/77, pulse 65, height 5' 3" (1.6 m), weight 69.2 kg (152 lb 9.6 oz).     General/Constitutional: NAD, well developed, well nourished  HENT: Normocephalic, atraumatic  CV: Intact distal pulses, regular rate  Resp: No respiratory distress or labored breathing  Lymphatic: No lymphadenopathy palpated  Neuro: Alert and Oriented x 3, no focal deficits  Psych: Normal mood, normal affect, normal judgement, normal behavior  Skin: Warm, dry, no rashes, no erythema       Knee Exam (focused):                  RIGHT LEFT   ROM:   0-130 5-90   Palpation: Effusion negative Small joint     MJL tenderness Negative Positive     LJL tenderness Negative Negative   Instability: Varus stable stable     Valgus stable stable   Special Tests: Lachman Negative Negative     Posterior drawer Negative Negative     Anterior drawer Negative Negative     Pivot shift not tested not tested     Dial not tested not tested   Patella: Palpation no tenderness Lateral facet     Mobility 1/4 1/4     Apprehension Negative Negative   Other: Single leg 1/4 squat not tested not tested      LE NV Exam: +2 DP/PT pulses bilaterally  Sensation intact to light touch L2-S1 bilaterally     Bilateral hip ROM demonstrates no pain actively or passively    No calf tenderness to palpation bilaterally    Knee Imaging    X-rays of the left knee were reviewed, which demonstrate Mild degenerative changes. I have reviewed the radiology report and agree with their impression.

## 2023-08-11 ENCOUNTER — HOSPITAL ENCOUNTER (OUTPATIENT)
Facility: MEDICAL CENTER | Age: 68
Discharge: HOME | End: 2023-08-11
Payer: MEDICARE

## 2023-08-11 DIAGNOSIS — M25.562 PAIN AND SWELLING OF LEFT KNEE: ICD-10-CM

## 2023-08-11 DIAGNOSIS — M25.462 PAIN AND SWELLING OF LEFT KNEE: ICD-10-CM

## 2023-08-11 PROCEDURE — 73721 MRI JNT OF LWR EXTRE W/O DYE: CPT

## 2023-08-11 PROCEDURE — G1004 CDSM NDSC: HCPCS

## 2023-08-25 ENCOUNTER — OFFICE VISIT (OUTPATIENT)
Dept: OBGYN CLINIC | Facility: MEDICAL CENTER | Age: 68
End: 2023-08-25
Payer: MEDICARE

## 2023-08-25 VITALS
SYSTOLIC BLOOD PRESSURE: 123 MMHG | HEART RATE: 68 BPM | DIASTOLIC BLOOD PRESSURE: 83 MMHG | BODY MASS INDEX: 27.09 KG/M2 | HEIGHT: 63 IN | WEIGHT: 152.9 LBS

## 2023-08-25 DIAGNOSIS — S83.242A OTHER TEAR OF MEDIAL MENISCUS, CURRENT INJURY, LEFT KNEE, INITIAL ENCOUNTER: Primary | ICD-10-CM

## 2023-08-25 PROCEDURE — 99214 OFFICE O/P EST MOD 30 MIN: CPT | Performed by: ORTHOPAEDIC SURGERY

## 2023-08-25 RX ORDER — ACETAMINOPHEN 325 MG/1
650 TABLET ORAL EVERY 6 HOURS PRN
OUTPATIENT
Start: 2023-08-25

## 2023-08-25 RX ORDER — CEFAZOLIN SODIUM 2 G/50ML
2000 SOLUTION INTRAVENOUS ONCE
OUTPATIENT
Start: 2023-08-25 | End: 2023-08-25

## 2023-08-25 RX ORDER — TRAMADOL HYDROCHLORIDE 50 MG/1
50 TABLET ORAL EVERY 6 HOURS PRN
OUTPATIENT
Start: 2023-08-25

## 2023-08-25 NOTE — PROGRESS NOTES
Ortho Sports Medicine Knee Follow Up Visit     Assesment:     79 y.o. female left knee moderate medial compartment OA with medial meniscus tear    Plan:    Conservative treatment:    Ice to knee for 20 minutes at least 1-2 times daily. PT for ROM/strengthening to knee, hip and core. Imaging: All imaging from today was reviewed by myself and explained to the patient. Injection:    No Injection planned at this time. Surgery: All of the risks and benefits of operative treatment were explained to the patient, as well as the risks and benefits of any alternative treatment options, including nonoperative care. The risks of surgical treatement include, but are not limited to, infection, bleeding, blood clot, neurovascular damage, need for further surgery, continued pain, cardiovascular risk, and anesthesia risk. The patient understood this and elects to proceed forward with surgical intervention. We will proceed forward with surgical arthroscopy of the knee with menisectomy of the medial and possible lateral menisectomy    Patient Denies history of blood clot. Patient denies personal or family history of bleeding or clotting disorder. Patient does not take any blood thinners. Patient denies cardiac history including none. Patient denies  high blood pressure. Patient does not  see a cardiologist. Patient Denies  current history of diabetes. Patient denies allergies to antibiotics. Patient denies history of MRSA infection. Patient Denies current tobacco use, reporting she quit . Discussed with patient use of narcotics for post-op pain. Patient denies history of opioid abuse. Medical clearance will not be obtained. Bloodwork will not be obtained. EKG will not be obtained. Patient does have a moderately sized Baker's cyst which we instructed that we will be below at this time.   We instructed that if after we perform the meniscectomy if the Baker's cyst continues we could consider future ultrasound-guided aspiration of the Baker's cyst.  Patient agrees with this plan. Follow up:    Return for 1 week post-op with Ramana Sheppard . Chief Complaint   Patient presents with   • Left Knee - Follow-up       History of Present Illness: The patient is returns for follow up of Left knee MRI. Since the prior visit, She reports no improvement. Patient continues to have pain in the medial aspect of the knee daily. Patient reports that she will be walking without any knee pain at all and will suddenly experience a sharp stabbing pain in the medial aspect of the knee that takes hours to alleviate. Patient reports that as suddenly as pain starts it can alleviate. Patient states that symptoms the pain will come on suddenly and then she feels a click within her knee as though something gets put back into place and then her pain resolves. Patient does note having clicking catching within the knee. Patient also notes having increased pain in the posterior aspect of the knee. Patient feels as though there is a firm ball in the back of her knee that prevents her from obtaining full flexion if she were to kneel in the back on her heels. Pain is located medial and posterior. Pain is improved by rest.  Pain is aggravated by weight bearing, walking and pivoting on a planted foot. Symptoms include clicking, catching, swelling and instability. The patient has tried rest, ice and NSAIDS.           Knee Surgical History:  None    Past Medical, Social and Family History:  Past Medical History:   Diagnosis Date   • Lyme disease      Past Surgical History:   Procedure Laterality Date   •  SECTION       Allergies   Allergen Reactions   • Shellfish-Derived Products - Food Allergy Swelling     Current Outpatient Medications on File Prior to Visit   Medication Sig Dispense Refill   • ibuprofen (MOTRIN) 100 mg/5 mL suspension Take by mouth every 6 (six) hours as needed for mild pain     • acetaminophen (TYLENOL) 650 mg CR tablet Take 1 tablet (650 mg total) by mouth every 8 (eight) hours as needed for mild pain or moderate pain (Patient not taking: Reported on 8/7/2023) 30 tablet 0   • Diclofenac Sodium (VOLTAREN) 1 % Apply 2 g topically 4 (four) times a day as needed (Joint pain) 100 g 0     No current facility-administered medications on file prior to visit. Social History     Socioeconomic History   • Marital status: /Civil Union     Spouse name: Not on file   • Number of children: Not on file   • Years of education: Not on file   • Highest education level: Not on file   Occupational History   • Not on file   Tobacco Use   • Smoking status: Former     Types: Cigarettes   • Smokeless tobacco: Never   Vaping Use   • Vaping Use: Never used   Substance and Sexual Activity   • Alcohol use: Yes     Comment: soc   • Drug use: Never   • Sexual activity: Not on file   Other Topics Concern   • Not on file   Social History Narrative   • Not on file     Social Determinants of Health     Financial Resource Strain: Not on file   Food Insecurity: Not on file   Transportation Needs: Not on file   Physical Activity: Not on file   Stress: Not on file   Social Connections: Not on file   Intimate Partner Violence: Not on file   Housing Stability: Not on file         I have reviewed the past medical, surgical, social and family history, medications and allergies as documented in the EMR. Review of systems: ROS is negative other than that noted in the HPI. Constitutional: Negative for fatigue and fever. Physical Exam:    Blood pressure 123/83, pulse 68, height 5' 3" (1.6 m), weight 69.4 kg (152 lb 14.4 oz).     General/Constitutional: NAD, well developed, well nourished  HENT: Normocephalic, atraumatic  CV: Intact distal pulses, regular rate  Resp: No respiratory distress or labored breathing  GI: Soft and non-tender   Lymphatic: No lymphadenopathy palpated  Neuro: Alert and Oriented x 3, no focal deficits  Psych: Normal mood, normal affect, normal judgement, normal behavior  Skin: Warm, dry, no rashes, no erythema      Knee Exam (focused): RIGHT LEFT   ROM:   0-130 0-130   Palpation: Effusion negative negative     MJL tenderness Negative Positive     LJL tenderness Negative Negative   Meniscus: Leana Negative Positive    Apley's Compression Negative Positive   Instability: Varus stable stable     Valgus stable stable   Special Tests: Lachman Negative Negative     Posterior drawer Negative Negative     Anterior drawer Negative Negative     Pivot shift not tested not tested     Dial not tested not tested   Patella: Palpation no tenderness no tenderness     Mobility 1/4 1/4     Apprehension Negative Negative   Other: Single leg 1/4 squat not tested not tested           LE NV Exam: +2 DP/PT pulses bilaterally  Sensation intact to light touch L2-S1 bilaterally    No calf tenderness to palpation bilaterally      Knee Imaging    MRI of the left knee was reviewed and demonstrates a medial meniscus tear with moderate medial compartment OA with a large area of the medial condyle demonstrating partial thickness loss but no full-thickness loss.   Patient also has a moderate-sized Baker's cyst. I have reviewed the radiologist report and agree with their impression

## 2023-08-25 NOTE — H&P (VIEW-ONLY)
Ortho Sports Medicine Knee Follow Up Visit     Assesment:     79 y.o. female left knee moderate medial compartment OA with medial meniscus tear    Plan:    Conservative treatment:    Ice to knee for 20 minutes at least 1-2 times daily. PT for ROM/strengthening to knee, hip and core. Imaging: All imaging from today was reviewed by myself and explained to the patient. Injection:    No Injection planned at this time. Surgery: All of the risks and benefits of operative treatment were explained to the patient, as well as the risks and benefits of any alternative treatment options, including nonoperative care. The risks of surgical treatement include, but are not limited to, infection, bleeding, blood clot, neurovascular damage, need for further surgery, continued pain, cardiovascular risk, and anesthesia risk. The patient understood this and elects to proceed forward with surgical intervention. We will proceed forward with surgical arthroscopy of the knee with menisectomy of the medial and possible lateral menisectomy    Patient Denies history of blood clot. Patient denies personal or family history of bleeding or clotting disorder. Patient does not take any blood thinners. Patient denies cardiac history including none. Patient denies  high blood pressure. Patient does not  see a cardiologist. Patient Denies  current history of diabetes. Patient denies allergies to antibiotics. Patient denies history of MRSA infection. Patient Denies current tobacco use, reporting she quit . Discussed with patient use of narcotics for post-op pain. Patient denies history of opioid abuse. Medical clearance will not be obtained. Bloodwork will not be obtained. EKG will not be obtained. Patient does have a moderately sized Baker's cyst which we instructed that we will be below at this time.   We instructed that if after we perform the meniscectomy if the Baker's cyst continues we could consider future ultrasound-guided aspiration of the Baker's cyst.  Patient agrees with this plan. Follow up:    Return for 1 week post-op with Sofia Sherwood . Chief Complaint   Patient presents with   • Left Knee - Follow-up       History of Present Illness: The patient is returns for follow up of Left knee MRI. Since the prior visit, She reports no improvement. Patient continues to have pain in the medial aspect of the knee daily. Patient reports that she will be walking without any knee pain at all and will suddenly experience a sharp stabbing pain in the medial aspect of the knee that takes hours to alleviate. Patient reports that as suddenly as pain starts it can alleviate. Patient states that symptoms the pain will come on suddenly and then she feels a click within her knee as though something gets put back into place and then her pain resolves. Patient does note having clicking catching within the knee. Patient also notes having increased pain in the posterior aspect of the knee. Patient feels as though there is a firm ball in the back of her knee that prevents her from obtaining full flexion if she were to kneel in the back on her heels. Pain is located medial and posterior. Pain is improved by rest.  Pain is aggravated by weight bearing, walking and pivoting on a planted foot. Symptoms include clicking, catching, swelling and instability. The patient has tried rest, ice and NSAIDS.           Knee Surgical History:  None    Past Medical, Social and Family History:  Past Medical History:   Diagnosis Date   • Lyme disease      Past Surgical History:   Procedure Laterality Date   •  SECTION       Allergies   Allergen Reactions   • Shellfish-Derived Products - Food Allergy Swelling     Current Outpatient Medications on File Prior to Visit   Medication Sig Dispense Refill   • ibuprofen (MOTRIN) 100 mg/5 mL suspension Take by mouth every 6 (six) hours as needed for mild pain     • acetaminophen (TYLENOL) 650 mg CR tablet Take 1 tablet (650 mg total) by mouth every 8 (eight) hours as needed for mild pain or moderate pain (Patient not taking: Reported on 8/7/2023) 30 tablet 0   • Diclofenac Sodium (VOLTAREN) 1 % Apply 2 g topically 4 (four) times a day as needed (Joint pain) 100 g 0     No current facility-administered medications on file prior to visit. Social History     Socioeconomic History   • Marital status: /Civil Union     Spouse name: Not on file   • Number of children: Not on file   • Years of education: Not on file   • Highest education level: Not on file   Occupational History   • Not on file   Tobacco Use   • Smoking status: Former     Types: Cigarettes   • Smokeless tobacco: Never   Vaping Use   • Vaping Use: Never used   Substance and Sexual Activity   • Alcohol use: Yes     Comment: soc   • Drug use: Never   • Sexual activity: Not on file   Other Topics Concern   • Not on file   Social History Narrative   • Not on file     Social Determinants of Health     Financial Resource Strain: Not on file   Food Insecurity: Not on file   Transportation Needs: Not on file   Physical Activity: Not on file   Stress: Not on file   Social Connections: Not on file   Intimate Partner Violence: Not on file   Housing Stability: Not on file         I have reviewed the past medical, surgical, social and family history, medications and allergies as documented in the EMR. Review of systems: ROS is negative other than that noted in the HPI. Constitutional: Negative for fatigue and fever. Physical Exam:    Blood pressure 123/83, pulse 68, height 5' 3" (1.6 m), weight 69.4 kg (152 lb 14.4 oz).     General/Constitutional: NAD, well developed, well nourished  HENT: Normocephalic, atraumatic  CV: Intact distal pulses, regular rate  Resp: No respiratory distress or labored breathing  GI: Soft and non-tender   Lymphatic: No lymphadenopathy palpated  Neuro: Alert and Oriented x 3, no focal deficits  Psych: Normal mood, normal affect, normal judgement, normal behavior  Skin: Warm, dry, no rashes, no erythema      Knee Exam (focused): RIGHT LEFT   ROM:   0-130 0-130   Palpation: Effusion negative negative     MJL tenderness Negative Positive     LJL tenderness Negative Negative   Meniscus: Leana Negative Positive    Apley's Compression Negative Positive   Instability: Varus stable stable     Valgus stable stable   Special Tests: Lachman Negative Negative     Posterior drawer Negative Negative     Anterior drawer Negative Negative     Pivot shift not tested not tested     Dial not tested not tested   Patella: Palpation no tenderness no tenderness     Mobility 1/4 1/4     Apprehension Negative Negative   Other: Single leg 1/4 squat not tested not tested           LE NV Exam: +2 DP/PT pulses bilaterally  Sensation intact to light touch L2-S1 bilaterally    No calf tenderness to palpation bilaterally      Knee Imaging    MRI of the left knee was reviewed and demonstrates a medial meniscus tear with moderate medial compartment OA with a large area of the medial condyle demonstrating partial thickness loss but no full-thickness loss.   Patient also has a moderate-sized Baker's cyst. I have reviewed the radiologist report and agree with their impression

## 2023-08-29 ENCOUNTER — ANESTHESIA EVENT (OUTPATIENT)
Dept: PERIOP | Facility: HOSPITAL | Age: 68
End: 2023-08-29
Payer: MEDICARE

## 2023-08-29 RX ORDER — MULTIVITAMIN
1 TABLET ORAL DAILY
COMMUNITY

## 2023-08-29 RX ORDER — IBUPROFEN 200 MG
TABLET ORAL EVERY 6 HOURS PRN
COMMUNITY

## 2023-08-29 RX ORDER — OXYMETAZOLINE HYDROCHLORIDE 0.05 G/100ML
2 SPRAY NASAL 2 TIMES DAILY
COMMUNITY

## 2023-08-29 NOTE — PRE-PROCEDURE INSTRUCTIONS
Pre-Surgery Instructions:   Medication Instructions   • Acetylcysteine (NAC PO) Stop taking 7 days prior to surgery. • ibuprofen (MOTRIN) 200 mg tablet Stop taking 7 days prior to surgery. • Multiple Vitamin (multivitamin) tablet Stop taking 7 days prior to surgery. • oxymetazoline (AFRIN) 0.05 % nasal spray Take day of surgery. Medication instructions for day surgery reviewed. Please use only a sip of water to take your instructed medications. Avoid all over the counter vitamins, supplements and NSAIDS for one week prior to surgery per anesthesia guidelines. Tylenol is ok to take as needed. You will receive a call one business day prior to surgery with an arrival time and hospital directions. If your surgery is scheduled on a Monday, the hospital will be calling you on the Friday prior to your surgery. If you have not heard from anyone by 8pm, please call the hospital supervisor through the hospital  at [phone]. Roxi Todd [phone]). Do not eat or drink anything after midnight the night before your surgery, including candy, mints, lifesavers, or chewing gum. Do not drink alcohol 24hrs before your surgery. Try not to smoke at least 24hrs before your surgery. Follow the pre surgery showering instructions as listed in the [facility] Surgical Experience Booklet” or otherwise provided by your surgeon's office. Do not shave the surgical area 24 hours before surgery. Do not apply any lotions, creams, including makeup, cologne, deodorant, or perfumes after showering on the day of your surgery. No contact lenses, eye make-up, or artificial eyelashes. Remove nail polish, including gel polish, and any artificial, gel, or acrylic nails if possible. Remove all jewelry including rings and body piercing jewelry. Wear causal clothing that is easy to take on and off. Consider your type of surgery. Keep any valuables, jewelry, piercings at home.  Please bring any specially ordered equipment (sling, braces) if indicated. Arrange for a responsible person to drive you to and from the hospital on the day of your surgery. Visitor Guidelines discussed. Call the surgeon's office with any new illnesses, exposures, or additional questions prior to surgery. Please reference your [facility] Surgical Experience Booklet” for additional information to prepare for your upcoming surgery.

## 2023-09-07 ENCOUNTER — ANESTHESIA (OUTPATIENT)
Dept: PERIOP | Facility: HOSPITAL | Age: 68
End: 2023-09-07
Payer: MEDICARE

## 2023-09-07 ENCOUNTER — HOSPITAL ENCOUNTER (OUTPATIENT)
Facility: HOSPITAL | Age: 68
Setting detail: OUTPATIENT SURGERY
Discharge: HOME | End: 2023-09-07
Attending: ORTHOPAEDIC SURGERY | Admitting: ORTHOPAEDIC SURGERY
Payer: MEDICARE

## 2023-09-07 VITALS
RESPIRATION RATE: 14 BRPM | HEART RATE: 70 BPM | SYSTOLIC BLOOD PRESSURE: 119 MMHG | WEIGHT: 152 LBS | OXYGEN SATURATION: 98 % | TEMPERATURE: 96.6 F | BODY MASS INDEX: 26.93 KG/M2 | DIASTOLIC BLOOD PRESSURE: 72 MMHG | HEIGHT: 63 IN

## 2023-09-07 DIAGNOSIS — Z98.890 S/P LATERAL MENISCECTOMY OF LEFT KNEE: ICD-10-CM

## 2023-09-07 DIAGNOSIS — Z98.890 S/P MEDIAL MENISCECTOMY OF LEFT KNEE: Primary | ICD-10-CM

## 2023-09-07 PROCEDURE — 29880 ARTHRS KNE SRG MNISECTMY M&L: CPT | Performed by: ORTHOPAEDIC SURGERY

## 2023-09-07 RX ORDER — ONDANSETRON 2 MG/ML
INJECTION INTRAMUSCULAR; INTRAVENOUS AS NEEDED
Status: DISCONTINUED | OUTPATIENT
Start: 2023-09-07 | End: 2023-09-07

## 2023-09-07 RX ORDER — SODIUM CHLORIDE, SODIUM LACTATE, POTASSIUM CHLORIDE, CALCIUM CHLORIDE 600; 310; 30; 20 MG/100ML; MG/100ML; MG/100ML; MG/100ML
100 INJECTION, SOLUTION INTRAVENOUS CONTINUOUS
Status: DISCONTINUED | OUTPATIENT
Start: 2023-09-07 | End: 2023-09-07 | Stop reason: HOSPADM

## 2023-09-07 RX ORDER — HYDROMORPHONE HCL IN WATER/PF 6 MG/30 ML
0.2 PATIENT CONTROLLED ANALGESIA SYRINGE INTRAVENOUS
Status: DISCONTINUED | OUTPATIENT
Start: 2023-09-07 | End: 2023-09-07 | Stop reason: HOSPADM

## 2023-09-07 RX ORDER — LIDOCAINE HYDROCHLORIDE 10 MG/ML
INJECTION, SOLUTION EPIDURAL; INFILTRATION; INTRACAUDAL; PERINEURAL AS NEEDED
Status: DISCONTINUED | OUTPATIENT
Start: 2023-09-07 | End: 2023-09-07

## 2023-09-07 RX ORDER — MAGNESIUM HYDROXIDE 1200 MG/15ML
LIQUID ORAL AS NEEDED
Status: DISCONTINUED | OUTPATIENT
Start: 2023-09-07 | End: 2023-09-07 | Stop reason: HOSPADM

## 2023-09-07 RX ORDER — OXYCODONE HYDROCHLORIDE 5 MG/1
5 TABLET ORAL EVERY 4 HOURS PRN
Qty: 15 TABLET | Refills: 0 | Status: SHIPPED | OUTPATIENT
Start: 2023-09-07

## 2023-09-07 RX ORDER — MIDAZOLAM HYDROCHLORIDE 2 MG/2ML
INJECTION, SOLUTION INTRAMUSCULAR; INTRAVENOUS AS NEEDED
Status: DISCONTINUED | OUTPATIENT
Start: 2023-09-07 | End: 2023-09-07

## 2023-09-07 RX ORDER — FENTANYL CITRATE 50 UG/ML
INJECTION, SOLUTION INTRAMUSCULAR; INTRAVENOUS AS NEEDED
Status: DISCONTINUED | OUTPATIENT
Start: 2023-09-07 | End: 2023-09-07

## 2023-09-07 RX ORDER — CEFAZOLIN SODIUM 2 G/50ML
2000 SOLUTION INTRAVENOUS ONCE
Status: COMPLETED | OUTPATIENT
Start: 2023-09-07 | End: 2023-09-07

## 2023-09-07 RX ORDER — ACETAMINOPHEN 325 MG/1
650 TABLET ORAL EVERY 6 HOURS PRN
Status: DISCONTINUED | OUTPATIENT
Start: 2023-09-07 | End: 2023-09-07 | Stop reason: HOSPADM

## 2023-09-07 RX ORDER — PROPOFOL 10 MG/ML
INJECTION, EMULSION INTRAVENOUS AS NEEDED
Status: DISCONTINUED | OUTPATIENT
Start: 2023-09-07 | End: 2023-09-07

## 2023-09-07 RX ORDER — FENTANYL CITRATE/PF 50 MCG/ML
25 SYRINGE (ML) INJECTION
Status: DISCONTINUED | OUTPATIENT
Start: 2023-09-07 | End: 2023-09-07 | Stop reason: HOSPADM

## 2023-09-07 RX ORDER — PHENYLEPHRINE HCL IN 0.9% NACL 1 MG/10 ML
SYRINGE (ML) INTRAVENOUS AS NEEDED
Status: DISCONTINUED | OUTPATIENT
Start: 2023-09-07 | End: 2023-09-07

## 2023-09-07 RX ORDER — TRAMADOL HYDROCHLORIDE 50 MG/1
50 TABLET ORAL EVERY 6 HOURS PRN
Status: DISCONTINUED | OUTPATIENT
Start: 2023-09-07 | End: 2023-09-07 | Stop reason: HOSPADM

## 2023-09-07 RX ORDER — SODIUM CHLORIDE, SODIUM LACTATE, POTASSIUM CHLORIDE, CALCIUM CHLORIDE 600; 310; 30; 20 MG/100ML; MG/100ML; MG/100ML; MG/100ML
INJECTION, SOLUTION INTRAVENOUS CONTINUOUS PRN
Status: DISCONTINUED | OUTPATIENT
Start: 2023-09-07 | End: 2023-09-07

## 2023-09-07 RX ORDER — ONDANSETRON 2 MG/ML
4 INJECTION INTRAMUSCULAR; INTRAVENOUS ONCE AS NEEDED
Status: DISCONTINUED | OUTPATIENT
Start: 2023-09-07 | End: 2023-09-07 | Stop reason: HOSPADM

## 2023-09-07 RX ORDER — DEXAMETHASONE SODIUM PHOSPHATE 10 MG/ML
INJECTION, SOLUTION INTRAMUSCULAR; INTRAVENOUS AS NEEDED
Status: DISCONTINUED | OUTPATIENT
Start: 2023-09-07 | End: 2023-09-07

## 2023-09-07 RX ORDER — OXYCODONE HYDROCHLORIDE 5 MG/1
5 TABLET ORAL ONCE
Status: COMPLETED | OUTPATIENT
Start: 2023-09-07 | End: 2023-09-07

## 2023-09-07 RX ADMIN — FENTANYL CITRATE 25 MCG: 50 INJECTION, SOLUTION INTRAMUSCULAR; INTRAVENOUS at 13:02

## 2023-09-07 RX ADMIN — MIDAZOLAM HYDROCHLORIDE 2 MG: 1 INJECTION, SOLUTION INTRAMUSCULAR; INTRAVENOUS at 12:38

## 2023-09-07 RX ADMIN — LIDOCAINE HYDROCHLORIDE 50 MG: 10 INJECTION, SOLUTION EPIDURAL; INFILTRATION; INTRACAUDAL; PERINEURAL at 12:42

## 2023-09-07 RX ADMIN — TRAMADOL HYDROCHLORIDE 50 MG: 50 TABLET, COATED ORAL at 15:15

## 2023-09-07 RX ADMIN — CEFAZOLIN SODIUM 2000 MG: 2 SOLUTION INTRAVENOUS at 12:38

## 2023-09-07 RX ADMIN — FENTANYL CITRATE 25 MCG: 50 INJECTION, SOLUTION INTRAMUSCULAR; INTRAVENOUS at 13:21

## 2023-09-07 RX ADMIN — FENTANYL CITRATE 25 MCG: 50 INJECTION, SOLUTION INTRAMUSCULAR; INTRAVENOUS at 13:29

## 2023-09-07 RX ADMIN — SODIUM CHLORIDE, SODIUM LACTATE, POTASSIUM CHLORIDE, AND CALCIUM CHLORIDE: .6; .31; .03; .02 INJECTION, SOLUTION INTRAVENOUS at 12:38

## 2023-09-07 RX ADMIN — FENTANYL CITRATE 25 MCG: 50 INJECTION, SOLUTION INTRAMUSCULAR; INTRAVENOUS at 14:27

## 2023-09-07 RX ADMIN — SODIUM CHLORIDE, SODIUM LACTATE, POTASSIUM CHLORIDE, AND CALCIUM CHLORIDE: .6; .31; .03; .02 INJECTION, SOLUTION INTRAVENOUS at 13:50

## 2023-09-07 RX ADMIN — PROPOFOL 200 MG: 10 INJECTION, EMULSION INTRAVENOUS at 12:42

## 2023-09-07 RX ADMIN — OXYCODONE HYDROCHLORIDE 5 MG: 5 TABLET ORAL at 16:12

## 2023-09-07 RX ADMIN — Medication 100 MCG: at 12:55

## 2023-09-07 RX ADMIN — Medication 100 MCG: at 13:10

## 2023-09-07 RX ADMIN — FENTANYL CITRATE 25 MCG: 50 INJECTION, SOLUTION INTRAMUSCULAR; INTRAVENOUS at 14:22

## 2023-09-07 RX ADMIN — DEXAMETHASONE SODIUM PHOSPHATE 10 MG: 10 INJECTION, SOLUTION INTRAMUSCULAR; INTRAVENOUS at 12:45

## 2023-09-07 RX ADMIN — ONDANSETRON 4 MG: 2 INJECTION INTRAMUSCULAR; INTRAVENOUS at 12:45

## 2023-09-07 RX ADMIN — FENTANYL CITRATE 25 MCG: 50 INJECTION, SOLUTION INTRAMUSCULAR; INTRAVENOUS at 13:13

## 2023-09-07 RX ADMIN — FENTANYL CITRATE 25 MCG: 50 INJECTION, SOLUTION INTRAMUSCULAR; INTRAVENOUS at 12:48

## 2023-09-07 NOTE — ANESTHESIA POSTPROCEDURE EVALUATION
Post-Op Assessment Note    CV Status:  Stable    Pain management: satisfactory to patient     Mental Status:  Alert and awake   Hydration Status:  Stable   PONV Controlled:  None   Airway Patency:  Patent      Post Op Vitals Reviewed: Yes      Staff: CRNA         No notable events documented.     BP   132/76   Temp   97.9   Pulse  77   Resp   16   SpO2   99

## 2023-09-07 NOTE — DISCHARGE INSTR - AVS FIRST PAGE
POSTOPERATIVE INSTRUCTIONS following KNEE SURGERY    MEDICATIONS:  Resume all home medications unless otherwise instructed by your surgeon. Pain Medication:  Oxycodone 5 mg, 1 tablet every 4 hours as needed  If you were given a regional anesthetic (nerve block), please begin taking the pain medication as soon as you get home, even if you have minimal or no pain. DO NOT WAIT FOR THE NERVE BLOCK TO WEAR OFF. Possible side effects include nausea, constipation, and urinary retention. If you experience these side effects, please call our office for assistance. Pain med refills are authorized only during office hours (8am-4pm, Mon-Fri). Anti-Inflammatory:  Ibuprofen 600 mg, 1 tablet every 8 hours for 1-4 weeks as needed and Tylenol 325 mg, 1-2 tablets every 6 hours for 1-4 weeks as needed  Take with food. Stop if you experience nausea, reflux, or stomach pain. Nausea Medication:  None  Fill prescription ONLY if you expericnce severe nausea. Blood Clot Prevention:  Aspirin 81 mg, 1 tablet twice daily for 3 weeks  Pump your foot up and down 20 times per hour while you are less mobile. WOUND CARE:  Keep the dressing clean and dry. Light drainage may occur the first 2 days postop. You may remove the dressings and get the incision wet in the shower 72 hours after surgery. DO NOT remove steri-strips or sutures. DO NOT immerse the incision under water. Carefully pat the incision dry. If there is wound drainage, re-apply a fresh dry gauze dressing. Please call our office ([phone]) if you experience either of the following:  Sudden increase in swelling, redness, or warmth at the surgical site  Excessive incisional drainage that persists beyond the 3rd day after surgery  Oral temperature greater than 101 degrees, not relieved with Tylenol  Shortness of breath, chest pain, nausea, or any other concerning symptoms    SWELLING CONTROL:  Cold Therapy:   The cold therapy device may be used either continuously or only as needed, according to your preference. Do not let the pad directly touch your skin. Alternatively, apply ice (20 min on, 20 min off) as often as you feel is necessary. Elevation:  Elevate the entire leg above heart level. Place pillows under your ankle to keep your knee straight. Compression:  Apply ACE wraps or a thigh-length compression stocking as needed. RANGE OF MOTION:  You are allowed FULL RANGE OF MOTION as tolerated. IMMOBILIZATION:  None. You are allowed full range of motion as tolerated. ACTIVITY:   BEAR FULL WEIGHT AS TOLERATED on the operative leg. Use crutches to assist only as needed. Using Crutches on Stairs:  Going up, lead with your "good" (nonoperative) leg. Going down, lead with your "bad" (operative) leg. Use a hand rail when available. Knee Extension:  Place a rolled towel or pillow under your ankle for 20-30 minutes 3-5 times per day. This will help to maintain full knee extension. Quad Sets:  Sit or lie with your knee straight. Tighten your quadriceps (front thigh) muscle. Hold for 3 seconds, then relax. Repeat 20 times per hour while awake. PHYSICAL THERAPY:  Begin therapy 3 TO 5 DAYS AFTER SURGERY. You were given a prescription for therapy at your preoperative office visit. If you do not have physical therapy scheduled yet, please call our office for assistance. FOLLOW-UP APPOINTMENT:  7-10 days after surgery with:    Dr. Windy Mullen D.O.   [address] [place], [facility]  [phone] ([address] [place] [address])  [phone] (After-Hours)

## 2023-09-07 NOTE — OP NOTE
OPERATIVE REPORT  PATIENT NAME: Costa Dela Cruz    :  1955  MRN: [id number]  Pt Location:  OR ROOM 11    SURGERY DATE: 2023    Surgeon(s) and Role:     * Stanford Allen DO - Primary     * Nain Mccarthy PA-C - Assisting     * Carla Lee MD - Assisting    Preop Diagnosis:  Other tear of medial meniscus, current injury, left knee, initial encounter [S83.242A]    Post-Op Diagnosis Codes:     * Other tear of medial meniscus, current injury, left knee, initial encounter [S83.242A]    Procedure(s):  Left - ARTHROSCOPY KNEE    Specimen(s):  * No specimens in log *    Estimated Blood Loss:   Minimal    Drains:  * No LDAs found *    Anesthesia Type:   * No anesthesia type entered *    Operative Indications: Other tear of medial meniscus, current injury, left knee, initial encounter [M33.391V]    Complications:   None    Procedure and Technique:      Pre-operative Diagnosis: Left knee lateral meniscus tear           Left knee medial meniscus tear    Post-operative Diagnosis: Left knee lateral meniscus tear          Left knee medial meniscus tear     Operation:  Surgical arthroscopy of the Left knee with partial medial AND lateral meniscectomy     Tourniquet Time: 30 min      Blood Loss:  Minimal      Indications: Ms. Tory Sparrow is a 79 y.o. female with a medial meniscus tear. An MRI was obtained a revealed a tear of the Left medial and lateral lateral meniscus. Due to the patient's MRI findings, active lifestyle, and lack of improvement with a conservative approach, it was recommended that they proceed forward with arthroscopic surgical management of this problem. We reviewed risks and benefits of surgery and a decision was made to proceed with surgery to address the torn medial and lateral lateral meniscus. Findings:       Examination under anesthesia of the operative Left knee revealed a range of motion of 0-130 degrees. Posterior drawer testing was negative. Lachman testing was negative. Pivot shift testing was negative,  Collateral ligament stability testing revealed no laxity with valgus or varus stresses. With respect to posterolateral corner testing, dial testing at 30 and at 90 degrees was symmetric to the contralateral knee. Arthroscopic evaluation of the knee revealed the following:     Medial meniscus: There was a complex, multidirectional tear of the medial meniscus  Medial femoral condyle:Grade IV chondral defects. Medial tibial plateau: Grade  IV chondral defects. Anterior cruciate ligament: Normal appearance. Posterior cruciate ligament: Normal appearance. Lateral meniscus: There was a complex, multidirectional tear of the lateral meniscus. .  Lateral femoral condyle: Grade IV chondral defects. Lateral tibial plateau: GradeIV chondral defects. Medial and lateral gutters: No loose bodies. Patella: Grade IV chondral defects. Trochlea: Grade III chondral defects. Medial plica: No significant plica was present. .          Procedure: In the pre-operative holding area, the patient identified the correct operative extremity and I marked that extremity with my initials, using a permanent marker. The patient was brought to the operating room and positioned supine. Following satisfactory induction of anesthesia, the Left knee was prepped and draped in the usual sterile fashion for surgical arthroscopy of the Left knee. Before any surgical instrumentation was passed to me by the surgical technician, a formalized time-out occurred, which involves the surgeon, circulating nurse, and anesthesia staff all verifying the correct operative extremity. My initials were visible on the prepped and draped operative field. The anatomic landmarks of the anteromedial and anterolateral portals were marked. The anterolateral portal was established with a scalpel.  The arthroscope was introduced through this portal. Under direct visualization, the anteromedial portal was established with a localizing needle followed by a scalpel. A probe was then introduced into the anteromedial portal. A systematic diagnostic arthroscopy evaluated the following:  medial compartment, notch, lateral compartment, patellofemoral compartment, medial gutter, and lateral gutter. A complex lateral meniscus tear was noted. This tear was associated with meniscal fragments that were grossly unstable to probing. The lateral meniscus tear was debrided to a stable base, using the arthroscopic biters and motorized shaver       A complex medial meniscus tear was noted. This tear was associated with meniscal fragments that were grossly unstable to probing. The medial meniscus tear was debrided to a stable base, using the arthroscopic biters and motorized shaver       There was no additional pathology. All particulate debris was removed. The knee was copiously rinsed and then drained. The portals were closed with an interrupted 4-0 monocryl absorbable suture. The skin was cleansed with sterile saline and dried before Steri-Strips were applied. Finally, a sterile dressing was secured by Webril and an Ace wrap. I was present for the entire procedure.     Patient Disposition:  PACU         SIGNATURE: Nehemiah Ravi DO  DATE: September 7, 2023  TIME: 1:57 PM

## 2023-09-07 NOTE — ANESTHESIA PREPROCEDURE EVALUATION
Procedure:  ARTHROSCOPIC MENISCECTOMY LATERAL /MEDIAL (Left: Knee)    Relevant Problems   Musculoskeletal and Integument   (+) Other tear of medial meniscus, current injury, left knee, initial encounter        Physical Exam    Airway    Mallampati score: II  TM Distance: >3 FB  Neck ROM: full     Dental       Cardiovascular      Pulmonary      Other Findings        Anesthesia Plan  ASA Score- 1     Anesthesia Type- general with ASA Monitors. Additional Monitors:   Airway Plan: LMA. Plan Factors-Exercise tolerance (METS): >4 METS. Chart reviewed. Patient summary reviewed. Patient is not a current smoker. Patient did not smoke on day of surgery. Induction- intravenous. Postoperative Plan- Plan for postoperative opioid use. Informed Consent- Anesthetic plan and risks discussed with patient. I personally reviewed this patient with the CRNA. Discussed and agreed on the Anesthesia Plan with the CRNA. Kendrick Lennox

## 2023-09-12 ENCOUNTER — EVALUATION (OUTPATIENT)
Dept: PHYSICAL THERAPY | Facility: CLINIC | Age: 68
End: 2023-09-12
Payer: MEDICARE

## 2023-09-12 DIAGNOSIS — Z98.890 S/P LATERAL MENISCECTOMY OF LEFT KNEE: Primary | ICD-10-CM

## 2023-09-12 DIAGNOSIS — Z98.890 S/P MEDIAL MENISCECTOMY OF LEFT KNEE: ICD-10-CM

## 2023-09-12 PROCEDURE — 97110 THERAPEUTIC EXERCISES: CPT | Performed by: PHYSICAL THERAPIST

## 2023-09-12 PROCEDURE — 97161 PT EVAL LOW COMPLEX 20 MIN: CPT | Performed by: PHYSICAL THERAPIST

## 2023-09-12 NOTE — PROGRESS NOTES
PT Evaluation     Today's date: 2023  Patient name: Cyn Mustafa  : 1955  MRN: [id number]  Referring provider: Rhiannon Patterson,*  Dx:   Encounter Diagnosis     ICD-10-CM    1. S/P lateral meniscectomy of left knee  Z98.890 Ambulatory Referral to Physical Therapy      2. S/P medial meniscectomy of left knee  Z98.890 Ambulatory Referral to Physical Therapy                     Assessment  Assessment details: Cyn Mustafa is a 79 y.o. female presenting to physical therapy s/p left knee partial medial and lateral menisectomy on 2023 and presents with pain, decreased range of motion, decreased strength, gait/balance dysfunction and decreased activity tolerance. Assessment reveals ROM, strength and functional impairment consistent with 6 days post surgery. Secondary to these impairments, patient has increased difficulty performing ADL's, household chores and  work related tasks. Luís Fontanez would benefit from skilled PT to address these issues and maximize function. Thank you for the referral.    Impairments: abnormal gait, abnormal or restricted ROM, abnormal movement, activity intolerance, impaired balance, impaired physical strength, lacks appropriate home exercise program, pain with function and weight-bearing intolerance  Understanding of Dx/Px/POC: excellent  Goals  STG (3-4 weeks)  1. Patient will be independent with HEP  2. Decrease pain at worst by 2 points on NPRS  3. Increase left knee PROM to WNL  4. Patient will demonstrate ability to ambulate with normal mechanics without an AD  LTG (6-8 weeks)  1. Decrease pain at worst from 4 points on NPRS  2. Increase left knee AROM to WNL  3. Patient will demonstrate ability to squat and perform reciprocal stair navigation without pain  4.  Increase FOTO score > or equal to expected outcome    Plan  Patient would benefit from: skilled PT  Planned therapy interventions: joint mobilization, manual therapy, patient education, neuromuscular re-education, strengthening, stretching, therapeutic activities, therapeutic exercise, transfer training, home exercise program, functional ROM exercises, balance/weight bearing training and ADL training  Frequency: 2x week  Duration in weeks: 8  Treatment plan discussed with: patient        Subjective Evaluation    History of Present Illness  Mechanism of injury: Patient reports to outpatient PT s/p left knee partial medial and lateral menisectomy on 2023. Patient describes her post-operative pain as a dull ache with sharp pain at times which is worse with gait/weight bearing activities, sleeping at night and improved with rest and ice. 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 Patients goals for PT are to decrease the pain and return to PLOF.             Recurrent probem    Quality of life: excellent    Patient Goals  Patient goals for therapy: increased strength, independence with ADLs/IADLs, return to sport/leisure activities, increased motion and decreased pain    Pain  Current pain ratin  At best pain rating: 3  At worst pain ratin  Quality: dull ache, tight, sharp and radiating  Relieving factors: rest and relaxation  Aggravating factors: standing, walking and stair climbing  Progression: worsening    Social Support  Steps to enter house: yes  Stairs in house: yes (basement)   Lives in: [facility]  Lives with: spouse    Employment status: working  Hand dominance: right      Diagnostic Tests  MRI studies: abnormal  Treatments  No previous or current treatments        Objective     Neurological Testing     Sensation     Knee   Left Knee   Intact: light touch    Additional Neurological Details  Low risk for DVT per wells criteria    (-) pitting edema    Active Range of Motion   Left Knee   Flexion: 120 degrees with pain  Extension: 5 degrees with pain    Passive Range of Motion   Left Knee   Flexion: 128 degrees with pain  Extension: 3 degrees with pain    Strength/Myotome Testing     Left Knee   Flexion: 4  Extension: 4    Right Knee   Flexion: 5  Extension: 5    Tests     Additional Tests Details  Gait assessment: Mild reduction in terminal knee extension during stance and mild reduction in stance time during the gait cycle; no AD    Stair navigation: Step to step pattern, up with the right, down with the left             Precautions: N/A      Manuals 9/12            Left knee patellar mobs             Left knee scar mobs                                       Neuro Re-Ed             Foam semi-tandem             Biodex maze                                                                              Ther Ex             Recumbent bike warm up             Standing gastroc stretch             Heel slides 2x10 x10"            Quad sets  2x10 x5"            SLR 3x10            Leg Press             Mini squats                          Ther Activity                                       Gait Training                                       Modalities

## 2023-09-13 ENCOUNTER — OFFICE VISIT (OUTPATIENT)
Dept: OBGYN CLINIC | Facility: MEDICAL CENTER | Age: 68
End: 2023-09-13

## 2023-09-13 VITALS
WEIGHT: 151 LBS | BODY MASS INDEX: 26.75 KG/M2 | DIASTOLIC BLOOD PRESSURE: 74 MMHG | HEIGHT: 63 IN | HEART RATE: 71 BPM | SYSTOLIC BLOOD PRESSURE: 107 MMHG

## 2023-09-13 DIAGNOSIS — S83.242A OTHER TEAR OF MEDIAL MENISCUS, CURRENT INJURY, LEFT KNEE, INITIAL ENCOUNTER: Primary | ICD-10-CM

## 2023-09-13 PROCEDURE — 99024 POSTOP FOLLOW-UP VISIT: CPT | Performed by: ORTHOPAEDIC SURGERY

## 2023-09-13 NOTE — PROGRESS NOTES
Knee Post Operative Visit     Assesment:     Surgical Arthroscopy of the left knee with medial meniscectomy and lateral meniscectomy    Plan:    Post-Operative treatment:    Ice to knee for 20 minutes at least 1-2 times daily. PT for ROM/strengthening to knee, hip and core. Imaging: All imaging from today was reviewed by myself and explained to the patient. Weight bearing:  as tolerated     ROM:  Full    Brace:  No brace needed    DVT Prophylaxis:  Aspirin 81 mg oral twice daily x 3 weeks    Follow up:   6 weeks     Patient was advised that if they have any fevers, chills, chest pain, shortness of breath, redness or drainage from the incision, please let our office know immediately. History of Present Illness: The patient is a 79 y.o. female who is being evaluated post operatively 1 week  status post medial and lateral meniscectomy. Since the prior visit, She reports minimal improvement. Pain is well controlled. The patient is using ice to control swelling. They have started physical therapy. The patient has been ambulating with crutches. The patient has been ambulating without a brace. The patient denies any fevers, chills, calf pain, chest pain/shortness of breath, redness or drainage from the incision. I have reviewed the past medical, surgical, social and family history, medications and allergies as documented in the EMR. Review of systems: ROS is negative other than that noted in the HPI. Constitutional: Negative for fatigue and fever. Physical Exam:    Height 5' 3" (1.6 m), weight 68.5 kg (151 lb).     General/Constitutional: NAD, well developed, well nourished  HENT: Normocephalic, atraumatic  CV: Intact distal pulses, regular rate  Resp: No respiratory distress or labored breathing  Lymphatic: No lymphadenopathy palpated  Neuro: Alert and Oriented x 3, no focal deficits  Psych: Normal mood, normal affect, normal judgement, normal behavior  Skin: Warm, dry, no rashes, no erythema       Knee Exam (focused):                  LEFT RIGHT   ROM:   0-130 0-130   Palpation: Effusion negative negative     MJL tenderness Positive Negative     LJL tenderness Positive Negative   Instability: Varus stable stable     Valgus stable stable   Special Tests: Lachman Negative Negative     Posterior drawer Negative Negative     Anterior drawer Negative Negative     Pivot shift not tested not tested     Dial not tested not tested   Patella: Palpation no tenderness no tenderness     Mobility 1/4 1/4     Apprehension Negative Negative   Other: Single leg 1/4 squat not tested not tested      Incisions show no erythema, no drainage    LE NV Exam: +2 DP/PT pulses bilaterally  Sensation intact to light touch L2-S1 bilaterally     Bilateral hip ROM demonstrates no pain actively or passively    No calf tenderness to palpation bilaterally

## 2023-09-15 ENCOUNTER — OFFICE VISIT (OUTPATIENT)
Dept: PHYSICAL THERAPY | Facility: CLINIC | Age: 68
End: 2023-09-15
Payer: MEDICARE

## 2023-09-15 DIAGNOSIS — Z98.890 S/P MEDIAL MENISCECTOMY OF LEFT KNEE: ICD-10-CM

## 2023-09-15 DIAGNOSIS — Z98.890 S/P LATERAL MENISCECTOMY OF LEFT KNEE: Primary | ICD-10-CM

## 2023-09-15 PROCEDURE — 97110 THERAPEUTIC EXERCISES: CPT | Performed by: PHYSICAL THERAPIST

## 2023-09-15 PROCEDURE — 97140 MANUAL THERAPY 1/> REGIONS: CPT | Performed by: PHYSICAL THERAPIST

## 2023-09-15 NOTE — PROGRESS NOTES
Daily Note     Today's date: 9/15/2023  Patient name: Triston Anaya  : 1955  MRN: [id number]  Referring provider: Jordyn Cha,*  Dx:   Encounter Diagnosis     ICD-10-CM    1. S/P lateral meniscectomy of left knee  Z98.890       2. S/P medial meniscectomy of left knee  Z98.890                      Subjective: Patient reports some swelling in the knee following her HEP compliance and states that she had to continue with ice for relief. Objective: See treatment diary below    1-136 degrees of left knee PROM    Assessment: Patient demonstrates significant improvement in knee mobility including extension as noted. Quad activation improved into knee extension and patient demonstrated fair tolerance to TE. Will hold on neuromuscular activities until next week per inflammation. Plan: Continue per plan of care.       Precautions: N/A      Manuals 9/12 9/15           Left knee patellar mobs  G4           Left knee scar mobs  G4                                     Neuro Re-Ed             Foam semi-tandem             Biodex maze                                                                              Ther Ex             Recumbent bike warm up  L1 5'           Standing gastroc stretch  3x30"           Heel slides 2x10 x10" 2x10 x10"           Quad sets  2x10 x5" 2x10 x5"           SLR 3x10 3x10           Leg Press - single leg   25# 3x10           TKE at [employer]  12# 2x10 x5"                        Ther Activity                                       Gait Training                                       Modalities

## 2023-09-19 ENCOUNTER — OFFICE VISIT (OUTPATIENT)
Dept: PHYSICAL THERAPY | Facility: CLINIC | Age: 68
End: 2023-09-19
Payer: MEDICARE

## 2023-09-19 DIAGNOSIS — Z98.890 S/P LATERAL MENISCECTOMY OF LEFT KNEE: Primary | ICD-10-CM

## 2023-09-19 DIAGNOSIS — Z98.890 S/P MEDIAL MENISCECTOMY OF LEFT KNEE: ICD-10-CM

## 2023-09-19 PROCEDURE — 97112 NEUROMUSCULAR REEDUCATION: CPT

## 2023-09-19 PROCEDURE — 97110 THERAPEUTIC EXERCISES: CPT

## 2023-09-19 PROCEDURE — 97140 MANUAL THERAPY 1/> REGIONS: CPT

## 2023-09-19 NOTE — PROGRESS NOTES
Daily Note     Today's date: 2023  Patient name: Oliver Ventura  : 1955  MRN: [id number]  Referring provider: Zuhair Benitez,*  Dx:   Encounter Diagnosis     ICD-10-CM    1. S/P lateral meniscectomy of left knee  Z98.890       2. S/P medial meniscectomy of left knee  Z98.890                      Subjective: Patient reported swelling continues to affect her sleep, taking Ibuprofen to help at night. Objective: See treatment diary below.  -1 to 138 deg of L knee ROM. Assessment: Educated patient on potential for swelling to fluccuate with healing and increased activity. Quad inhibition decreasing with minimal extensor lag during SLR. Neuromuscular control progressed with challenging stability with decreasing BRIDGET on unstable surface with good use of ankle strategy to recover. Plan: Continue per plan of care.            Precautions: N/A    Manuals 9/12 9/15 9/19          Left knee patellar mobs  G4 PROM - EH          Left knee scar mobs  G4 EH                                    Neuro Re-Ed             Foam semi-tandem   30"x3          Huoshi   Med  Static  x1                                                                           Ther Ex             Recumbent bike warm up  L1 5' 5 min  L1          Standing gastroc stretch  3x30" 30"x3          Heel slides 2x10 x10" 2x10 x10" 10" hold,  2x10          Quad sets  2x10 x5" 2x10 x5" 5" hold,  2x10          SLR 3x10 3x10 3x10          Leg Press - single leg   25# 3x10 25#  3x10          TKE at radha  12# 2x10 x5" 12.5  5" hold,  3x10                       Ther Activity                                       Gait Training                                       Modalities

## 2023-09-22 ENCOUNTER — OFFICE VISIT (OUTPATIENT)
Dept: PHYSICAL THERAPY | Facility: CLINIC | Age: 68
End: 2023-09-22
Payer: MEDICARE

## 2023-09-22 DIAGNOSIS — Z98.890 S/P MEDIAL MENISCECTOMY OF LEFT KNEE: ICD-10-CM

## 2023-09-22 DIAGNOSIS — Z98.890 S/P LATERAL MENISCECTOMY OF LEFT KNEE: Primary | ICD-10-CM

## 2023-09-22 PROCEDURE — 97112 NEUROMUSCULAR REEDUCATION: CPT | Performed by: PHYSICAL THERAPIST

## 2023-09-22 PROCEDURE — 97140 MANUAL THERAPY 1/> REGIONS: CPT | Performed by: PHYSICAL THERAPIST

## 2023-09-22 PROCEDURE — 97110 THERAPEUTIC EXERCISES: CPT | Performed by: PHYSICAL THERAPIST

## 2023-09-22 NOTE — PROGRESS NOTES
Daily Note     Today's date: 2023  Patient name: Mamta Murphy  : 1955  MRN: [id number]  Referring provider: Josiane Bell,*  Dx:   Encounter Diagnosis     ICD-10-CM    1. S/P lateral meniscectomy of left knee  Z98.890       2. S/P medial meniscectomy of left knee  Z98.890                      Subjective: Patient reports some improvement in overall function and decreased pain. Some swelling remains but notes that she was working in the yard yesterday. Objective: See treatment diary below. 0 to 1140 deg of L knee ROM. Assessment: Left knee ROM continues to make gains and is within functional ranges per above. Gait improved without evidence of instability and demonstrates equal weight bearing. Quad strength and activation improved allowing for progression of strengthening which she tolerated well. Plan: Continue per plan of care.            Precautions: N/A    Manuals 9/12 9/15 9/19 9/22         Left knee patellar mobs  G4 PROM - EH G4         Left knee scar mobs  G4 EH G4                                   Neuro Re-Ed             Foam sharpened romberg   30"x3 3x30"  B/L         Biodex maze   Med  Static  x1 Med static x1 35%                                                                          Ther Ex             Recumbent bike warm up  L1 5' 5 min  L1 L1 5'         Standing gastroc stretch  3x30" 30"x3 3x30"         Heel slides 2x10 x10" 2x10 x10" 10" hold,  2x10 2x10 x10" D/C        Quad sets  2x10 x5" 2x10 x5" 5" hold,  2x10 2x10 x5" D/C        SLR 3x10 3x10 3x10 1# 3x10         Leg Press - single leg   25# 3x10 25#  3x10 35# 3x10         TKE at radha  12# 2x10 x5" 12.5  5" hold,  3x10 15# 2x10 x5"         Wall sit squats    2x10 x3"         Supine HS curls w/ pball    NV         Hip abduction machine    NV         Ther Activity                                       Gait Training                                       Modalities

## 2023-09-26 ENCOUNTER — OFFICE VISIT (OUTPATIENT)
Dept: PHYSICAL THERAPY | Facility: CLINIC | Age: 68
End: 2023-09-26
Payer: MEDICARE

## 2023-09-26 DIAGNOSIS — Z98.890 S/P MEDIAL MENISCECTOMY OF LEFT KNEE: ICD-10-CM

## 2023-09-26 DIAGNOSIS — Z98.890 S/P LATERAL MENISCECTOMY OF LEFT KNEE: Primary | ICD-10-CM

## 2023-09-26 PROCEDURE — 97112 NEUROMUSCULAR REEDUCATION: CPT | Performed by: PHYSICAL THERAPIST

## 2023-09-26 PROCEDURE — 97140 MANUAL THERAPY 1/> REGIONS: CPT | Performed by: PHYSICAL THERAPIST

## 2023-09-26 PROCEDURE — 97110 THERAPEUTIC EXERCISES: CPT | Performed by: PHYSICAL THERAPIST

## 2023-09-26 NOTE — PROGRESS NOTES
Daily Note     Today's date: 2023  Patient name: Robles Weinberg  : 1955  MRN: [id number]  Referring provider: Joanie Kanner,*  Dx:   Encounter Diagnosis     ICD-10-CM    1. S/P lateral meniscectomy of left knee  Z98.890       2. S/P medial meniscectomy of left knee  Z98.890                      Subjective: Patient reports increased swelling this weekend but states that she was with her grandchildren and unable to perform her HEP. Objective: See treatment diary below. 0 to 140 deg of L knee ROM. Assessment: Patient demonstrates knee PROM that remains WFL despite increased swelling. Progressed strengthening exercises within pain free limits. Cues required to maintain quad/hs activation without compensation. Plan: Continue per plan of care.            Precautions: N/A    Manuals 9/12 9/15 9/19 9/22 9/26        Left knee patellar mobs  G4 PROM - EH G4 G4        Left knee scar mobs  G4 EH G4 G4                                  Neuro Re-Ed             Foam sharpened romberg   30"x3 3x30"  B/L 3x30" B/L        Biodex maze   Med  Static  x1 Med static x1 35% Med static x1 35%                                                                         Ther Ex             Recumbent bike warm up  L1 5' 5 min  L1 L1 5' L1 5'        Standing gastroc stretch  3x30" 30"x3 3x30" 3x30"        Heel slides 2x10 x10" 2x10 x10" 10" hold,  2x10 2x10 x10" D/C        Quad sets  2x10 x5" 2x10 x5" 5" hold,  2x10 2x10 x5" D/C        SLR 3x10 3x10 3x10 1# 3x10 1#  3x10        Leg Press - single leg   25# 3x10 25#  3x10 35# 3x10 35# 3x10        TKE at radha  12# 2x10 x5" 12.5  5" hold,  3x10 15# 2x10 x5" 15# 2x10 x5"        Wall sit squats    2x10 x3" 10 x3"        Supine HS curls w/ pball    NV 2x10        Hip abduction machine    NV 25# 2x10 B/L        Ther Activity                                       Gait Training                                       Modalities

## 2023-09-28 ENCOUNTER — OFFICE VISIT (OUTPATIENT)
Dept: PHYSICAL THERAPY | Facility: CLINIC | Age: 68
End: 2023-09-28
Payer: MEDICARE

## 2023-09-28 DIAGNOSIS — Z98.890 S/P LATERAL MENISCECTOMY OF LEFT KNEE: Primary | ICD-10-CM

## 2023-09-28 DIAGNOSIS — Z98.890 S/P MEDIAL MENISCECTOMY OF LEFT KNEE: ICD-10-CM

## 2023-09-28 PROCEDURE — 97110 THERAPEUTIC EXERCISES: CPT | Performed by: PHYSICAL THERAPIST

## 2023-09-28 PROCEDURE — 97140 MANUAL THERAPY 1/> REGIONS: CPT | Performed by: PHYSICAL THERAPIST

## 2023-09-28 PROCEDURE — 97112 NEUROMUSCULAR REEDUCATION: CPT | Performed by: PHYSICAL THERAPIST

## 2023-09-28 NOTE — PROGRESS NOTES
Daily Note     Today's date: 2023  Patient name: Walter Conklin  : 1955  MRN: [id number]  Referring provider: Pinky Toledo,*  Dx:   Encounter Diagnosis     ICD-10-CM    1. S/P lateral meniscectomy of left knee  Z98.890       2. S/P medial meniscectomy of left knee  Z98.890                      Subjective: Patient states that her knee has been doing well since last visit. Notes having no discomfort today and was up several times last night without pain. Objective: See treatment diary below. Assessment: Discharged heel slides and quad sets per ROM improvements that are WNL. Scar and patellar mobility continue to make improvement and are nearing normal mobility as well. Progressed reps with TE and began functional progression for resumption of iADL's with fair overall tolerance. Plan: Continue per plan of care.            Precautions: N/A    Manuals 9/12 9/15 9/19 9/22 9/26 9/28       Left knee patellar mobs  G4 PROM - EH G4 G4 G4       Left knee scar mobs  G4 EH G4 G4 G4                                 Neuro Re-Ed             Foam sharpened romberg   30"x3 3x30"  B/L 3x30" B/L 3x30" B/L       Biodex maze   Med  Static  x1 Med static x1 35% Med static x1 35% Med static x1 35%       BOSU squats      2x10                                                           Ther Ex             Recumbent bike warm up  L1 5' 5 min  L1 L1 5' L1 5' L1 5'       Standing gastroc stretch  3x30" 30"x3 3x30" 3x30" 3x30"       Heel slides 2x10 x10" 2x10 x10" 10" hold,  2x10 2x10 x10" D/C        Quad sets  2x10 x5" 2x10 x5" 5" hold,  2x10 2x10 x5" D/C        SLR 3x10 3x10 3x10 1# 3x10 1#  3x10 2# 3x10       Leg Press - single leg   25# 3x10 25#  3x10 35# 3x10 35# 3x10 35# 3x12       TKE at radha  12# 2x10 x5" 12.5  5" hold,  3x10 15# 2x10 x5" 15# 2x10 x5" 15# 2x10       Wall sit squats    2x10 x3" 10 x3" 10 x3"       Supine HS curls w/ pball    NV 2x10 3x10       Hip abduction machine    NV 25# 2x10 B/L 25# 2x10 B/L       Step ups - 6"      2x10                                 Ther Activity                                       Gait Training                                       Modalities

## 2023-10-02 ENCOUNTER — OFFICE VISIT (OUTPATIENT)
Dept: PHYSICAL THERAPY | Facility: CLINIC | Age: 68
End: 2023-10-02
Payer: MEDICARE

## 2023-10-02 DIAGNOSIS — Z98.890 S/P MEDIAL MENISCECTOMY OF LEFT KNEE: ICD-10-CM

## 2023-10-02 DIAGNOSIS — Z98.890 S/P LATERAL MENISCECTOMY OF LEFT KNEE: Primary | ICD-10-CM

## 2023-10-02 PROCEDURE — 97110 THERAPEUTIC EXERCISES: CPT

## 2023-10-02 PROCEDURE — 97112 NEUROMUSCULAR REEDUCATION: CPT

## 2023-10-02 PROCEDURE — 97140 MANUAL THERAPY 1/> REGIONS: CPT

## 2023-10-02 NOTE — PROGRESS NOTES
Daily Note     Today's date: 10/2/2023  Patient name: Carissa Castano  : 1955  MRN: [id number]  Referring provider: Reymundo Vargas,*  Dx:   Encounter Diagnosis     ICD-10-CM    1. S/P lateral meniscectomy of left knee  Z98.890       2. S/P medial meniscectomy of left knee  Z98.890                      Subjective: Patient reported she continues to see improvement in both pain and function. Objective: See treatment diary below. Assessment: Both scar and patellar mobility continue to improve. Flex full with no reported pain or tightness at end ranges. Challenged with current neuromuscular based activities with decreasing BRIDGET and on unstable surfaces for carryover into functional activities. Plan: Continue per plan of care.            Precautions: N/A    Manuals 9/12 9/15 9/19 9/22 9/26 9/28 10/2      Left knee patellar mobs  G4 PROM - EH G4 G4 G4 EH      Left knee scar mobs  G4 EH G4 G4 G4 EH                                Neuro Re-Ed             Foam sharpened romberg   30"x3 3x30"  B/L 3x30" B/L 3x30" B/L 30"x3  bilat      Biodex maze   Med  Static  x1 Med static x1 35% Med static x1 35% Med static x1 35% Med Static  x1  33%      BOSU squats      2x10 2x10                                                          Ther Ex             Recumbent bike warm up  L1 5' 5 min  L1 L1 5' L1 5' L1 5' 5 min  L1      Standing gastroc stretch  3x30" 30"x3 3x30" 3x30" 3x30" 30"x3      Heel slides 2x10 x10" 2x10 x10" 10" hold,  2x10 2x10 x10" D/C        Quad sets  2x10 x5" 2x10 x5" 5" hold,  2x10 2x10 x5" D/C        SLR 3x10 3x10 3x10 1# 3x10 1#  3x10 2# 3x10 2#  3x10      Leg Press - single leg   25# 3x10 25#  3x10 35# 3x10 35# 3x10 35# 3x12 35#  3x12      TKE at radha  12# 2x10 x5" 12.5  5" hold,  3x10 15# 2x10 x5" 15# 2x10 x5" 15# 2x10 15.0  2x10      Wall sit squats    2x10 x3" 10 x3" 10 x3" 3"x10      Supine HS curls w/ pball    NV 2x10 3x10  3x10      Hip abduction machine    NV 25# 2x10 B/L 25# 2x10 B/L  25#   2x10   bilat      Step ups - 6"      2x10  3x10                                Ther Activity                                       Gait Training                                       Modalities

## 2023-10-04 ENCOUNTER — OFFICE VISIT (OUTPATIENT)
Dept: PHYSICAL THERAPY | Facility: CLINIC | Age: 68
End: 2023-10-04
Payer: MEDICARE

## 2023-10-04 DIAGNOSIS — Z98.890 S/P LATERAL MENISCECTOMY OF LEFT KNEE: Primary | ICD-10-CM

## 2023-10-04 DIAGNOSIS — Z98.890 S/P MEDIAL MENISCECTOMY OF LEFT KNEE: ICD-10-CM

## 2023-10-04 PROCEDURE — 97110 THERAPEUTIC EXERCISES: CPT | Performed by: PHYSICAL THERAPIST

## 2023-10-04 PROCEDURE — 97112 NEUROMUSCULAR REEDUCATION: CPT | Performed by: PHYSICAL THERAPIST

## 2023-10-04 PROCEDURE — 97140 MANUAL THERAPY 1/> REGIONS: CPT | Performed by: PHYSICAL THERAPIST

## 2023-10-04 NOTE — PROGRESS NOTES
Daily Note     Today's date: 10/4/2023  Patient name: Pawan Avila  : 1955  MRN: [id number]  Referring provider: Cristine Barnes,*  Dx:   Encounter Diagnosis     ICD-10-CM    1. S/P lateral meniscectomy of left knee  Z98.890       2. S/P medial meniscectomy of left knee  Z98.890                      Subjective: Patient states that she continues to make significant functional progress and reports no pain. Tightness in the AM noted that resolves with mobility. Objective: See treatment diary below. Assessment: Patient demonstrates a steady improvement in functional strength and mobility. Knee flexion and extension are WNL but requires cues to maintain full extension at times with TE. Proper motor control noted with step downs and working to increase stability over the next several visits. Plan: Continue per plan of care.            Precautions: N/A    Manuals 9/12 9/15 9/19 9/22 9/26 9/28 10/2 10/4     Left knee patellar mobs  G4 PROM - EH G4 G4 G4 EH G4     Left knee scar mobs  G4 EH G4 G4 G4 EH G4                               Neuro Re-Ed             Foam sharpened romberg   30"x3 3x30"  B/L 3x30" B/L 3x30" B/L 30"x3  bilat 3x30" B/L     Biodex maze   Med  Static  x1 Med static x1 35% Med static x1 35% Med static x1 35% Med Static  x1  33% Med static x1 39%     BOSU squats      2x10 2x10 2x10 x2"                                                         Ther Ex             Recumbent bike warm up  L1 5' 5 min  L1 L1 5' L1 5' L1 5' 5 min  L1 L1 5'     Standing gastroc stretch  3x30" 30"x3 3x30" 3x30" 3x30" 30"x3 3x30"     Heel slides 2x10 x10" 2x10 x10" 10" hold,  2x10 2x10 x10" D/C        Quad sets  2x10 x5" 2x10 x5" 5" hold,  2x10 2x10 x5" D/C        SLR 3x10 3x10 3x10 1# 3x10 1#  3x10 2# 3x10 2#  3x10 3# 3x10     Leg Press - single leg   25# 3x10 25#  3x10 35# 3x10 35# 3x10 35# 3x12 35#  3x12 45# 3x10     TKE at radha  12# 2x10 x5" 12.5  5" hold,  3x10 15# 2x10 x5" 15# 2x10 x5" 15# 2x10 15.0  2x10 18# 2x10     Wall sit squats    2x10 x3" 10 x3" 10 x3" 3"x10 2x10 x3"     Supine HS curls w/ pball    NV 2x10 3x10  3x10 3x10     Hip abduction machine    NV 25# 2x10 B/L 25# 2x10 B/L  25#   2x10   bilat 25# 2x10 B/L     Step ups - 6" - lateral      2x10  3x10 2x10                               Ther Activity                                       Gait Training                                       Modalities

## 2023-10-09 ENCOUNTER — OFFICE VISIT (OUTPATIENT)
Dept: PHYSICAL THERAPY | Facility: CLINIC | Age: 68
End: 2023-10-09
Payer: MEDICARE

## 2023-10-09 DIAGNOSIS — Z98.890 S/P MEDIAL MENISCECTOMY OF LEFT KNEE: ICD-10-CM

## 2023-10-09 DIAGNOSIS — Z98.890 S/P LATERAL MENISCECTOMY OF LEFT KNEE: Primary | ICD-10-CM

## 2023-10-09 PROCEDURE — 97112 NEUROMUSCULAR REEDUCATION: CPT

## 2023-10-09 PROCEDURE — 97110 THERAPEUTIC EXERCISES: CPT

## 2023-10-09 PROCEDURE — 97140 MANUAL THERAPY 1/> REGIONS: CPT

## 2023-10-09 NOTE — PROGRESS NOTES
Daily Note     Today's date: 10/9/2023  Patient name: Sean Saini  : 1955  MRN: [id number]  Referring provider: Scotty Sofia,*  Dx:   Encounter Diagnosis     ICD-10-CM    1. S/P lateral meniscectomy of left knee  Z98.890       2. S/P medial meniscectomy of left knee  Z98.890                      Subjective: Patient reported knee stiffness with sitting > 30 min with knee flexed. Doing well overall with general function. Objective: See treatment diary below. Assessment: Continues to struggle maintaining knee ext throughout quad based strengthening due to weakness. Overall stability is improving when challenged with more dynamic neuromuscular activities. Will continue to work on advancing stability for carryover into daily functions and tasks to avoid any increased stress through knee. Plan: Continue per plan of care.            Precautions: N/A    Manuals 9/12 9/15 9/19 9/22 9/26 9/28 10/2 10/4 10/9    Left knee patellar mobs  G4 PROM - EH G4 G4 G4 EH G4  [facility]    Left knee scar mobs  G4 EH G4 G4 G4 EH G4 EH                              Neuro Re-Ed             Foam sharpened romberg   30"x3 3x30"  B/L 3x30" B/L 3x30" B/L 30"x3  bilat 3x30" B/L 30"x3  bilat    Biodex maze   Med  Static  x1 Med static x1 35% Med static x1 35% Med static x1 35% Med Static  x1  33% Med static x1 39% Med  Static  x1  43%    BOSU squats      2x10 2x10 2x10 x2" 2"  2x10                                                        Ther Ex             Recumbent bike warm up  L1 5' 5 min  L1 L1 5' L1 5' L1 5' 5 min  L1 L1 5' 5 min  L1    Standing gastroc stretch  3x30" 30"x3 3x30" 3x30" 3x30" 30"x3 3x30" 30"x3    Heel slides 2x10 x10" 2x10 x10" 10" hold,  2x10 2x10 x10" D/C        Quad sets  2x10 x5" 2x10 x5" 5" hold,  2x10 2x10 x5" D/C        SLR 3x10 3x10 3x10 1# 3x10 1#  3x10 2# 3x10 2#  3x10 3# 3x10 3#  3x10    Leg Press - single leg   25# 3x10 25#  3x10 35# 3x10 35# 3x10 35# 3x12 35#  3x12 45# 3x10 45#  3x10    TKE at [employer]  12# 2x10 x5" 12.5  5" hold,  3x10 15# 2x10 x5" 15# 2x10 x5" 15# 2x10 15.0  2x10 18# 2x10 18.0  2x10    Wall sit squats    2x10 x3" 10 x3" 10 x3" 3"x10 2x10 x3" 3" hold,  2x10    Supine HS curls w/ pball    NV 2x10 3x10  3x10 3x10  3x10    Hip abduction machine    NV 25# 2x10 B/L 25# 2x10 B/L  25#   2x10   bilat 25# 2x10 B/L  25#   2x10   bilat    Step ups - 6" - lateral      2x10  3x10 2x10  2x10                              Ther Activity                                       Gait Training                                       Modalities

## 2023-10-11 ENCOUNTER — EVALUATION (OUTPATIENT)
Dept: PHYSICAL THERAPY | Facility: CLINIC | Age: 68
End: 2023-10-11
Payer: MEDICARE

## 2023-10-11 DIAGNOSIS — Z98.890 S/P MEDIAL MENISCECTOMY OF LEFT KNEE: ICD-10-CM

## 2023-10-11 DIAGNOSIS — Z98.890 S/P LATERAL MENISCECTOMY OF LEFT KNEE: Primary | ICD-10-CM

## 2023-10-11 PROCEDURE — 97140 MANUAL THERAPY 1/> REGIONS: CPT | Performed by: PHYSICAL THERAPIST

## 2023-10-11 PROCEDURE — 97110 THERAPEUTIC EXERCISES: CPT | Performed by: PHYSICAL THERAPIST

## 2023-10-11 NOTE — PROGRESS NOTES
PT Re-Evaluation     Today's date: 10/11/2023  Patient name: Ismael Monday  : 1955  MRN: [id number]  Referring provider: Kamla Rios,*  Dx:   Encounter Diagnosis     ICD-10-CM    1. S/P lateral meniscectomy of left knee  Z98.890       2. S/P medial meniscectomy of left knee  Z98.890                      Assessment  Assessment details: Patient is a 79y.o. year old female who attended physical therapy for 10 treatment sessions s/p left knee partial medial and lateral menisectomy on 2023 Patient reports X% improvement at this time which correlates to improved FOTO scoring. Patient has shown improvement throughout PT by demonstrating decreased pain, increased range of motion, increased strength, improved tolerance to activity, and improved gait/balance. Patient continues to present with pain, decreased ROM, decreased strength, and decreased tolerance to activity. [employer] would benefit from continued physical therapy to address these issues and to maximize function. Thank you. Impairments: abnormal gait, abnormal or restricted ROM, abnormal movement, activity intolerance, impaired balance, impaired physical strength, lacks appropriate home exercise program, pain with function and weight-bearing intolerance  Understanding of Dx/Px/POC: excellent  Goals  STG (3-4 weeks)  1. Patient will be independent with HEP (MET)  2. Decrease pain at worst by 2 points on NPRS (MET)  3. Increase left knee PROM to WNL (MET)  4. Patient will demonstrate ability to ambulate with normal mechanics without an AD (MET)  LTG (6-8 weeks)  1. Decrease pain at worst from 4 points on NPRS (PROGRESSING)  2. Increase left knee AROM to WNL (MET)  3. Patient will demonstrate ability to squat and perform reciprocal stair navigation without pain (PROGRESSING)  4.  Increase FOTO score > or equal to expected outcome (PROGRESSING)    Plan  Patient would benefit from: skilled PT  Planned therapy interventions: joint mobilization, manual therapy, patient education, neuromuscular re-education, strengthening, stretching, therapeutic activities, therapeutic exercise, transfer training, home exercise program, functional ROM exercises, balance/weight bearing training and ADL training  Frequency: 2x week  Duration in weeks: 4  Treatment plan discussed with: patient        Subjective Evaluation    History of Present Illness  Mechanism of injury: Patient reports to outpatient PT s/p left knee partial medial and lateral menisectomy on 2023. Patient describes her post-operative pain as a dull ache with sharp pain at times which is worse with gait/weight bearing activities, sleeping at night and improved with rest and ice. 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 Patients goals for PT are to decrease the pain and return to PLOF.      10/11/2023: Patient reports continued HEP compliance with significant improvement. States that she was one her feet standing/ambulating for several hours at her daughters wedding this past weekend and notes stiffness upon activity from sitting but otherwise did well. No discomfort with ambulation/standing otherwise.            Recurrent probem    Quality of life: excellent    Patient Goals  Patient goals for therapy: increased strength, independence with ADLs/IADLs, return to sport/leisure activities, increased motion and decreased pain    Pain  Current pain ratin  At best pain ratin  At worst pain ratin  Quality: dull ache, tight, sharp and radiating  Relieving factors: rest and relaxation  Aggravating factors: standing, walking and stair climbing  Progression: worsening    Social Support  Steps to enter house: yes  Stairs in house: yes (basement)   [facility]  Lives with: spouse    Employment status: working  Hand dominance: right      Diagnostic Tests  MRI studies: abnormal  Treatments  No previous or current treatments        Objective     Neurological Testing     Sensation     Knee   Left Knee   Intact: Light touch    Additional Neurological Details  Low risk for DVT per wells criteria    (-) pitting edema    Active Range of Motion   Left Knee   Flexion: 142 degrees   Extension: 4 degrees with pain    Passive Range of Motion   Left Knee   Flexion: 144 degrees   Extension: 3 degrees with pain    Strength/Myotome Testing     Left Knee   Flexion: 4+  Extension: 4+    Right Knee   Flexion: 5  Extension: 5    Tests     Additional Tests Details  Gait assessment: Mild reduction in terminal knee extension during stance and mild reduction in stance time during the gait cycle; no AD (10/11/2023: Mild reduction in terminal knee extension remains, but normal pattern and weight bearing otherwise)    Stair navigation: Step to step pattern, up with the right, down with the left (10/11/2023: Reciprocal pattern, occasional railing)             Precautions: N/A    Manuals 9/12 9/15 9/19 9/22 9/26 9/28 10/2 10/4 10/9 10/11   Left knee patellar mobs  G4 PROM - EH G4 G4 G4 EH G4 EH    Left knee scar mobs  G4 EH G4 G4 G4 EH G4 EH    Re-eval          15'                Neuro Re-Ed             Foam sharpened romberg   30"x3 3x30"  B/L 3x30" B/L 3x30" B/L 30"x3  bilat 3x30" B/L 30"x3  bilat NV   Biodex maze   Med  Static  x1 Med static x1 35% Med static x1 35% Med static x1 35% Med Static  x1  33% Med static x1 39% Med  Static  x1  43% NV   BOSU squats      2x10 2x10 2x10 x2" 2"  2x10 NV                                                       Ther Ex             Recumbent bike warm up  L1 5' 5 min  L1 L1 5' L1 5' L1 5' 5 min  L1 L1 5' 5 min  L1 L1 5'   Standing gastroc stretch  3x30" 30"x3 3x30" 3x30" 3x30" 30"x3 3x30" 30"x3 3x30"   Heel slides 2x10 x10" 2x10 x10" 10" hold,  2x10 2x10 x10" D/C        Quad sets  2x10 x5" 2x10 x5" 5" hold,  2x10 2x10 x5" D/C        SLR 3x10 3x10 3x10 1# 3x10 1#  3x10 2# 3x10 2#  3x10 3# 3x10 D/C    Leg Press - single leg   25# 3x10 25#  3x10 35# 3x10 35# 3x10 35# 3x12 35#  3x12 45# 3x10 45#  3x10 45# 3x10   TKE at radha  12# 2x10 x5" 12.5  5" hold,  3x10 15# 2x10 x5" 15# 2x10 x5" 15# 2x10 15.0  2x10 18# 2x10 18 #  2x10 18# 2x10 x5"   Wall sit squats    2x10 x3" 10 x3" 10 x3" 3"x10 2x10 x3" 3" hold,  2x10 2x10 x3"   Supine HS curls w/ pball    NV 2x10 3x10  3x10 3x10  3x10 3x10   Hip abduction machine    NV 25# 2x10 B/L 25# 2x10 B/L  25#   2x10   bilat 25# 2x10 B/L  25#   2x10   bilat 40# 2x10 B/L   Step ups - 6" - lateral      2x10  3x10 2x10  2x10 2x10                             Ther Activity                                       Gait Training                                       Modalities

## 2023-10-17 ENCOUNTER — OFFICE VISIT (OUTPATIENT)
Dept: PHYSICAL THERAPY | Facility: CLINIC | Age: 68
End: 2023-10-17
Payer: MEDICARE

## 2023-10-17 DIAGNOSIS — Z98.890 S/P LATERAL MENISCECTOMY OF LEFT KNEE: Primary | ICD-10-CM

## 2023-10-17 DIAGNOSIS — Z98.890 S/P MEDIAL MENISCECTOMY OF LEFT KNEE: ICD-10-CM

## 2023-10-17 PROCEDURE — 97110 THERAPEUTIC EXERCISES: CPT | Performed by: PHYSICAL THERAPIST

## 2023-10-17 PROCEDURE — 97140 MANUAL THERAPY 1/> REGIONS: CPT | Performed by: PHYSICAL THERAPIST

## 2023-10-17 NOTE — PROGRESS NOTES
Daily Note     Today's date: 10/17/2023  Patient name: Ismael Monday  : 1955  MRN: [id number]  Referring provider: Kamla Rios,*  Dx:   Encounter Diagnosis     ICD-10-CM    1. S/P lateral meniscectomy of left knee  Z98.890       2. S/P medial meniscectomy of left knee  Z98.890                      Subjective: Patient reports continued HEP compliance but notes some medial joint tenderness altering her sleep per discomfort. Objective: See treatment diary below      Assessment: Manuals demonstrate improving patellar and scar mobility and reproduction of reported discomfort with bursa palpation. Held on repetitive closed chain quad and hamstring exercises and was able to perform wall sits without reproduction of pain. Plan: Continue per plan of care.       Precautions: N/A    Manuals 10/17        10/9 10/11   Left knee patellar mobs [id number] [facility]    Left knee scar mobs 20[address] [facility]    Re-eval          15'                Neuro Re-Ed             Foam sharpened romberg 3x30" B/L        30"x3  bilat NV   Biodex maze D/C        Med  Static  x1  43% NV   BOSU squats 2x10 x2"        2"  2x10 NV   Rockerboard A/P, M/L x20 ea. + 2x20" w/ perturbations                                                   Ther Ex             Recumbent bike warm up L1 5'        5 min  L1 L1 5'   Standing gastroc stretch 3x30"        30"x3 3x30"   Heel slides             Quad sets              SLR         D/C    Leg Press - single leg  45# 3x10        45#  3x10 45# 3x10   TKE at radha 18# 2x10 x5"        18 #  2x10 18# 2x10 x5"   Wall sit squats X3 failure (45" max)        3" hold,  2x10 2x10 x3"   Supine HS curls w/ pball hold         3x10 3x10   Hip abduction machine 40# 2x10 B/L         25#   2x10   bilat 40# 2x10 B/L   Step ups - 6" - lateral hold         2x10 2x10                             Ther Activity                                       Gait Training                                       Modalities

## 2023-10-19 ENCOUNTER — EVALUATION (OUTPATIENT)
Dept: PHYSICAL THERAPY | Facility: CLINIC | Age: 68
End: 2023-10-19
Payer: MEDICARE

## 2023-10-19 DIAGNOSIS — Z98.890 S/P LATERAL MENISCECTOMY OF LEFT KNEE: Primary | ICD-10-CM

## 2023-10-19 DIAGNOSIS — Z98.890 S/P MEDIAL MENISCECTOMY OF LEFT KNEE: ICD-10-CM

## 2023-10-19 PROCEDURE — 97140 MANUAL THERAPY 1/> REGIONS: CPT | Performed by: PHYSICAL THERAPIST

## 2023-10-19 PROCEDURE — 97110 THERAPEUTIC EXERCISES: CPT | Performed by: PHYSICAL THERAPIST

## 2023-10-19 NOTE — PROGRESS NOTES
PT Re-Evaluation     Today's date: 10/19/2023  Patient name: Derek Unger  : 1955  MRN: [id number]  Referring provider: Maddison Beyer,*  Dx:   Encounter Diagnosis     ICD-10-CM    1. S/P lateral meniscectomy of left knee  Z98.890       2. S/P medial meniscectomy of left knee  Z98.890                      Assessment  Assessment details: Patient is a 79y.o. year old female who attended physical therapy for 12 treatment sessions s/p left knee partial medial and lateral menisectomy on 2023. Patient reports significant improvement at this time which correlates to improved FOTO scoring. Patient has shown improvement throughout PT by demonstrating decreased pain, increased range of motion, increased strength, improved tolerance to activity, and improved gait/balance. Patient continues to present with pain, decreased ROM, decreased strength, and decreased tolerance to activity. Lacey Delvalle would benefit from continued physical therapy to address these issues and to maximize function. Thank you. Impairments: abnormal gait, abnormal or restricted ROM, abnormal movement, activity intolerance, impaired balance, impaired physical strength, lacks appropriate home exercise program, pain with function and weight-bearing intolerance  Understanding of Dx/Px/POC: excellent  Goals  STG (3-4 weeks)  1. Patient will be independent with HEP (MET)  2. Decrease pain at worst by 2 points on NPRS (MET)  3. Increase left knee PROM to WNL (MET)  4. Patient will demonstrate ability to ambulate with normal mechanics without an AD (MET)  LTG (6-8 weeks)  1. Decrease pain at worst from 4 points on NPRS (MET)  2. Increase left knee AROM to WNL (MET)  3. Patient will demonstrate ability to squat and perform reciprocal stair navigation without pain (PARTIALLY MET)  4.  Increase FOTO score > or equal to expected outcome (MET)    Plan  Patient would benefit from: skilled PT  Planned therapy interventions: joint mobilization, manual therapy, patient education, neuromuscular re-education, strengthening, stretching, therapeutic activities, therapeutic exercise, transfer training, home exercise program, functional ROM exercises, balance/weight bearing training and ADL training  Frequency: 2x week  Duration in weeks: 4  Treatment plan discussed with: patient        Subjective Evaluation    History of Present Illness  Mechanism of injury: Patient reports to outpatient PT s/p left knee partial medial and lateral menisectomy on 2023. Patient describes her post-operative pain as a dull ache with sharp pain at times which is worse with gait/weight bearing activities, sleeping at night and improved with rest and ice. 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 Patients goals for PT are to decrease the pain and return to PLOF.      10/11/2023: Patient reports continued HEP compliance with significant improvement. States that she was one her feet standing/ambulating for several hours at her daughters wedding this past weekend and notes stiffness upon activity from sitting but otherwise did well. No discomfort with ambulation/standing otherwise. 10/19/2023: Patient states that she continues to well with function and has no significant discomfort throughout the day. States that she has begun to experience discomfort in the medial knee at night and can impact her sleep. Otherwise has been doing well and is compliant/independent with her HEP.            Recurrent probem    Quality of life: excellent    Patient Goals  Patient goals for therapy: increased strength, independence with ADLs/IADLs, return to sport/leisure activities, increased motion and decreased pain    Pain  Current pain ratin  At best pain ratin  At worst pain ratin  Quality: dull ache, tight, sharp and radiating  Relieving factors: rest and relaxation  Aggravating factors: standing, walking and stair climbing  Progression: worsening    Social Support  Steps to enter house: yes  Stairs in house: yes (basement)   Lives in: one-story house  Lives with: spouse    Employment status: working  Hand dominance: right      Diagnostic Tests  MRI studies: abnormal  Treatments  No previous or current treatments        Objective     Neurological Testing     Sensation     Knee   Left Knee   Intact: Light touch    Additional Neurological Details  Low risk for DVT per wells criteria    (-) pitting edema    Active Range of Motion   Left Knee   Flexion: 142 degrees   Extension: 4 degrees with pain    Passive Range of Motion   Left Knee   Flexion: 144 degrees   Extension: 3 degrees with pain    Strength/Myotome Testing     Left Knee   Flexion: 4+  Extension: 4+    Right Knee   Flexion: 5  Extension: 5    Tests     Additional Tests Details  Gait assessment: Mild reduction in terminal knee extension during stance and mild reduction in stance time during the gait cycle; no AD (10/11/2023: Mild reduction in terminal knee extension remains, but normal pattern and weight bearing otherwise)    Stair navigation: Step to step pattern, up with the right, down with the left (10/11/2023: Reciprocal pattern, occasional railing)             Precautions: N/A    Manuals 10/17 10/19       10/9 10/11   Left knee patellar mobs 4' 4'       [facility]    Left knee scar mobs 4' 4'       [facility]    Re-eval  15'        15'                Neuro Re-Ed             Foam sharpened romberg 3x30" B/L        30"x3  bilat NV   Biodex maze D/C        Med  Static  x1  43% NV   BOSU squats 2x10 x2"        2"  2x10 NV   Rockerboard A/P, M/L x20 ea. + 2x20" w/ perturbations                                                   Ther Ex             Recumbent bike warm up L1 5'        5 min  L1 L1 5'   Standing gastroc stretch 3x30"        30"x3 3x30"   Heel slides             Quad sets              SLR         D/C    Leg Press - single leg  45# 3x10        45#  3x10 45# 3x10   TKE at [employer] 18# 2x10 x5"        18 #  2x10 18# 2x10 x5"   Wall sit squats X3 failure (45" max)        3" hold,  2x10 2x10 x3"   Supine HS curls w/ pball hold         3x10 3x10   Hip abduction machine 40# 2x10 B/L         25#   2x10   bilat 40# 2x10 B/L   Step ups - 6" - lateral hold         2x10 2x10   Self HS stretching  Seated + standing adductor  3x20" Ea.            HEP Review  10'           Ther Activity                                       Gait Training                                       Modalities

## 2023-10-25 ENCOUNTER — OFFICE VISIT (OUTPATIENT)
Dept: OBGYN CLINIC | Facility: MEDICAL CENTER | Age: 68
End: 2023-10-25

## 2023-10-25 VITALS
BODY MASS INDEX: 27.18 KG/M2 | DIASTOLIC BLOOD PRESSURE: 72 MMHG | HEIGHT: 63 IN | HEART RATE: 69 BPM | SYSTOLIC BLOOD PRESSURE: 117 MMHG | WEIGHT: 153.4 LBS

## 2023-10-25 DIAGNOSIS — Z98.890 S/P MEDIAL MENISCUS REPAIR OF LEFT KNEE: Primary | ICD-10-CM

## 2023-10-25 DIAGNOSIS — S83.242S: ICD-10-CM

## 2023-10-25 PROCEDURE — 99024 POSTOP FOLLOW-UP VISIT: CPT | Performed by: ORTHOPAEDIC SURGERY

## 2023-10-25 NOTE — PROGRESS NOTES
Knee Post Operative Visit     Assesment:     79 y.o. female 6 weeks s/p surgical arthroscopy of the left knee with medial meniscectomy, DOS: 09/07/2023. Plan:    Post-Operative treatment:    Ice to knee for 20 minutes at least 1-2 times daily. Continue PT for ROM/strengthening to knee, hip and core. OTC NSAIDS prn for pain. Tylenol for pain. Voltaren gel for pain as needed. I explained to the patient it is possible her medial knee pain is due to tight hamstrings vs osteoarthritis flare up. Imaging:    No imaging was available for review today. Weight bearing:  as tolerated     ROM:  Full    Brace:  No brace needed    DVT Prophylaxis:  Ambulation    Follow up:   6 weeks     Patient was advised that if they have any fevers, chills, chest pain, shortness of breath, redness or drainage from the incision, please let our office know immediately. Chief Complaint   Patient presents with    Left Knee - Follow-up       History of Present Illness: The patient is a 79 y.o. female who is being evaluated post operatively 6 weeks  status post left knee medial meniscectomy, DOS 09/07/2023. Since the prior visit, She reports significant improvement. Overall, she feels her pain prior to surgery has significantly improved. Pain is well controlled. The patient is using ice to control swelling. They have started physical therapy. She reports medial knee pain following her last PT session. The patient Ambulation for DVT ppx. The patient has been ambulating without crutches. The patient has been ambulating without a brace. The patient denies any fevers, chills, calf pain, chest pain/shortness of breath, redness or drainage from the incision. I have reviewed the past medical, surgical, social and family history, medications and allergies as documented in the EMR. Review of systems: ROS is negative other than that noted in the HPI. Constitutional: Negative for fatigue and fever. Physical Exam:    Blood pressure 117/72, pulse 69, height 5' 3" (1.6 m), weight 69.6 kg (153 lb 6.4 oz). General/Constitutional: NAD, well developed, well nourished  HENT: Normocephalic, atraumatic  CV: Intact distal pulses, regular rate  Resp: No respiratory distress or labored breathing  Lymphatic: No lymphadenopathy palpated  Neuro: Alert and Oriented x 3, no focal deficits  Psych: Normal mood, normal affect, normal judgement, normal behavior  Skin: Warm, dry, no rashes, no erythema      Knee Exam (focused):                   RIGHT LEFT   ROM:   0-130 0-130   Palpation: Effusion negative negative     MJL tenderness Negative Positive     LJL tenderness Negative Negative   Instability: Varus stable stable     Valgus stable stable   Special Tests: Lachman Negative Negative     Posterior drawer Negative Negative     Anterior drawer Negative Negative     Pivot shift not tested not tested     Dial not tested not tested   Patella: Palpation no tenderness no tenderness     Mobility 1/4 1/4     Apprehension Negative Negative   Other: Single leg 1/4 squat not tested not tested      Incisions show no erythema, no drainage    LE NV Exam: +2 DP/PT pulses bilaterally  Sensation intact to light touch L2-S1 bilaterally     Bilateral hip ROM demonstrates no pain actively or passively    No calf tenderness to palpation bilaterally    Scribe Attestation      I,:  Kenneth Sharma am acting as a scribe while in the presence of the attending physician.:       I,:  Yisel Allen, DO personally performed the services described in this documentation    as scribed in my presence.:

## 2024-01-17 ENCOUNTER — TELEPHONE (OUTPATIENT)
Dept: OTHER | Facility: OTHER | Age: 69
End: 2024-01-17

## 2024-01-17 NOTE — TELEPHONE ENCOUNTER
Pt called wanting to reschedule her appointment for today 1/17. Please call patient when office reopens.

## 2024-01-31 ENCOUNTER — OFFICE VISIT (OUTPATIENT)
Dept: OBGYN CLINIC | Facility: MEDICAL CENTER | Age: 69
End: 2024-01-31
Payer: MEDICARE

## 2024-01-31 VITALS
WEIGHT: 150 LBS | HEIGHT: 63 IN | HEART RATE: 70 BPM | SYSTOLIC BLOOD PRESSURE: 126 MMHG | BODY MASS INDEX: 26.58 KG/M2 | DIASTOLIC BLOOD PRESSURE: 83 MMHG

## 2024-01-31 DIAGNOSIS — Z98.890 S/P MEDIAL MENISCUS REPAIR OF LEFT KNEE: ICD-10-CM

## 2024-01-31 DIAGNOSIS — S83.242S: Primary | ICD-10-CM

## 2024-01-31 PROCEDURE — 99213 OFFICE O/P EST LOW 20 MIN: CPT | Performed by: ORTHOPAEDIC SURGERY

## 2024-01-31 NOTE — PROGRESS NOTES
"Knee Post Operative Visit     Assesment:     68 y.o. female 4.5 months s/p surgical arthroscopy of the left knee with medial meniscectomy, DOS: 09/07/2023.    Plan:    Post-Operative treatment:    Ice to knee for 20 minutes at least 1-2 times daily.  HEP  OTC NSAIDS prn for pain.  Tylenol for pain.    Can consider USG aspiration of bakers cyst if bothered by posterior cyst seen on Mri of knee    Chief Complaint   Patient presents with    Left Knee - Follow-up     Pain score from sitting to standing and just sitting .       History of Present Illness:    The patient is a 68 y.o. female who is being evaluated post operatively 6 weeks  status post left knee medial meniscectomy, DOS 09/07/2023.    Since the prior visit, She reports significant improvement. Overall, she feels her pain prior to surgery has significantly improved.     The patient notes some occasional posterior pain in the knee around the area of her previously discussed Baker's cyst.  She notes some mild anterior knee pain.  She notes the medial joint line pain that was there prior to surgery is much better.  Pain is better with rest and worse with activities.      I have reviewed the past medical, surgical, social and family history, medications and allergies as documented in the EMR.    Review of systems: ROS is negative other than that noted in the HPI.  Constitutional: Negative for fatigue and fever.        Physical Exam:    Blood pressure 126/83, pulse 70, height 5' 3\" (1.6 m), weight 68 kg (150 lb).    General/Constitutional: NAD, well developed, well nourished  HENT: Normocephalic, atraumatic  CV: Intact distal pulses, regular rate  Resp: No respiratory distress or labored breathing  Lymphatic: No lymphadenopathy palpated  Neuro: Alert and Oriented x 3, no focal deficits  Psych: Normal mood, normal affect, normal judgement, normal behavior  Skin: Warm, dry, no rashes, no erythema      Knee Exam (focused):                  RIGHT LEFT   ROM:   0-130 " 0-130   Palpation: Effusion negative negative     MJL tenderness Negative Positive     LJL tenderness Negative Negative   Instability: Varus stable stable     Valgus stable stable   Special Tests: Lachman Negative Negative     Posterior drawer Negative Negative     Anterior drawer Negative Negative     Pivot shift not tested not tested     Dial not tested not tested   Patella: Palpation no tenderness no tenderness     Mobility 1/4 1/4     Apprehension Negative Negative   Other: Single leg 1/4 squat not tested not tested      Incisions show no erythema, no drainage    LE NV Exam: +2 DP/PT pulses bilaterally  Sensation intact to light touch L2-S1 bilaterally     Bilateral hip ROM demonstrates no pain actively or passively    No calf tenderness to palpation bilaterally    Scribe Attestation      I,:   am acting as a scribe while in the presence of the attending physician.:       I,:   personally performed the services described in this documentation    as scribed in my presence.:

## 2024-08-30 ENCOUNTER — TELEPHONE (OUTPATIENT)
Dept: FAMILY MEDICINE CLINIC | Facility: CLINIC | Age: 69
End: 2024-08-30

## 2024-08-30 NOTE — TELEPHONE ENCOUNTER
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 She is unable to come in at this time .

## 2025-01-16 ENCOUNTER — OFFICE VISIT (OUTPATIENT)
Dept: FAMILY MEDICINE CLINIC | Facility: CLINIC | Age: 70
End: 2025-01-16
Payer: MEDICARE

## 2025-01-16 VITALS
BODY MASS INDEX: 25.59 KG/M2 | TEMPERATURE: 97.1 F | HEIGHT: 63 IN | OXYGEN SATURATION: 99 % | DIASTOLIC BLOOD PRESSURE: 90 MMHG | SYSTOLIC BLOOD PRESSURE: 138 MMHG | WEIGHT: 144.4 LBS | HEART RATE: 82 BPM

## 2025-01-16 DIAGNOSIS — Z87.891 HISTORY OF TOBACCO USE: ICD-10-CM

## 2025-01-16 DIAGNOSIS — Z12.11 SCREEN FOR COLON CANCER: ICD-10-CM

## 2025-01-16 DIAGNOSIS — Z13.220 SCREENING, LIPID: ICD-10-CM

## 2025-01-16 DIAGNOSIS — Z13.1 SCREENING FOR DIABETES MELLITUS: ICD-10-CM

## 2025-01-16 DIAGNOSIS — Z11.59 NEED FOR HEPATITIS C SCREENING TEST: ICD-10-CM

## 2025-01-16 DIAGNOSIS — Z13.0 SCREENING, ANEMIA, DEFICIENCY, IRON: ICD-10-CM

## 2025-01-16 DIAGNOSIS — Z13.29 SCREENING FOR THYROID DISORDER: ICD-10-CM

## 2025-01-16 DIAGNOSIS — Z13.820 SCREENING FOR OSTEOPOROSIS: ICD-10-CM

## 2025-01-16 DIAGNOSIS — Z12.31 ENCOUNTER FOR SCREENING MAMMOGRAM FOR MALIGNANT NEOPLASM OF BREAST: ICD-10-CM

## 2025-01-16 DIAGNOSIS — Z00.00 MEDICARE ANNUAL WELLNESS VISIT, INITIAL: Primary | ICD-10-CM

## 2025-01-16 DIAGNOSIS — Z78.0 POST-MENOPAUSAL: ICD-10-CM

## 2025-01-16 PROCEDURE — G0438 PPPS, INITIAL VISIT: HCPCS | Performed by: NURSE PRACTITIONER

## 2025-01-16 NOTE — PROGRESS NOTES
Name: Darby Silva      : 1955      MRN: [id number]  Encounter Provider: BA Hoang  Encounter Date: 2025   Encounter department: [facility]    Assessment & Plan  Medicare annual wellness visit, initial  Pleasant 69-year-old female presents today for annual Medicare exam  Reports feeling well-denying any healthcare concerns today       Need for hepatitis C screening test    Orders:    Hepatitis C Antibody; Future    History of tobacco use    Orders:    CT lung screening program; Future    Screening, anemia, deficiency, iron    Orders:    CBC and differential; Future    Screening for diabetes mellitus    Orders:    Comprehensive metabolic panel; Future    Screening for thyroid disorder    Orders:    TSH, 3rd generation with Free T4 reflex; Future    Screening, lipid    Orders:    Lipid panel; Future    Encounter for screening mammogram for malignant neoplasm of breast    Orders:    Mammo screening bilateral w 3d and cad; Future    Screening for osteoporosis    Orders:    DXA bone density spine hip and pelvis; Future    Post-menopausal    Orders:    DXA bone density spine hip and pelvis; Future    Screen for colon cancer    Orders:    Ambulatory Referral to Gastroenterology; Future      Depression Screening and Follow-up Plan: Patient was screened for depression during today's encounter. They screened negative with a PHQ-2 score of 0.      Preventive health issues were discussed with patient, and age appropriate screening tests were ordered as noted in patient's After Visit Summary. Personalized health advice and appropriate referrals for health education or preventive services given if needed, as noted in patient's After Visit Summary.    History of Present Illness     Medicare wellness  No chronic condition management  Patient feels well with no concerns today       Patient Care Team:  Bunny Huang DO as PCP - General (Family Medicine)    Review of Systems    Constitutional: Negative.    Respiratory: Negative.     Cardiovascular: Negative.    Gastrointestinal: Negative.    Genitourinary: Negative.    Neurological: Negative.    Psychiatric/Behavioral: Negative.       Medical History Reviewed by provider this encounter:  Tobacco  Allergies  Meds  Problems  Med Hx  Surg Hx  Fam Hx       Annual Wellness Visit Questionnaire   Darby is here for her Initial Wellness visit.     Health Risk Assessment:   Patient rates overall health as very good. Patient feels that their physical health rating is much better. Patient is satisfied with their life. Eyesight was rated as same. Hearing was rated as same. Patient feels that their emotional and mental health rating is slightly better. Patients states they are never, rarely angry. Patient states they are sometimes unusually tired/fatigued. Pain experienced in the last 7 days has been none. Patient states that she has experienced no weight loss or gain in last 6 months.     Depression Screening:   PHQ-2 Score: 0      Fall Risk Screening:   In the past year, patient has experienced: no history of falling in past year      Urinary Incontinence Screening:   Patient has not leaked urine accidently in the last six months.     Home Safety:  Patient does not have trouble with stairs inside or outside of their home. Patient has working smoke alarms and has working carbon monoxide detector. Home safety hazards include: none.     Nutrition:   Current diet is Regular and Limited junk food.     Medications:   Patient is currently taking over-the-counter supplements. OTC medications include: see medication list. Patient is able to manage medications.     Activities of Daily Living (ADLs)/Instrumental Activities of Daily Living (IADLs):   Walk and transfer into and out of bed and chair?: Yes  Dress and groom yourself?: Yes    Bathe or shower yourself?: Yes    Feed yourself? Yes  Do your laundry/housekeeping?: Yes  Manage your money, pay your  bills and track your expenses?: Yes  Make your own meals?: Yes    Do your own shopping?: Yes    Previous Hospitalizations:   Any hospitalizations or ED visits within the last 12 months?: No      Advance Care Planning:   Living will: Yes    Durable POA for healthcare: Yes    Advanced directive: Yes      PREVENTIVE SCREENINGS      Cardiovascular Screening:      Due for: Lipid Panel      Diabetes Screening:       Due for: Blood Glucose      Colorectal Cancer Screening:       Due for: Colonoscopy - Low Risk      Breast Cancer Screening:       Due for: Mammogram        Cervical Cancer Screening:    General: Screening Not Indicated      Osteoporosis Screening:      Due for: Bone Density Ultrasound      Abdominal Aortic Aneurysm (AAA) Screening:        General: Screening Not Indicated      Lung Cancer Screening:     General: Screening Not Indicated      Hepatitis C Screening:    General: Risks and Benefits Discussed      Preventive Screening Comments: Pt prefers natural remedies  Reports eats healthy/organic  Uses herbs/vitamins  Declines medication treatments    Age appropriate preventative care and screening recommendations reviewed  Pt declines to complete at this time  She is aware they are in the system if she changes her mind  Education provided  Immunizations reviewed: Tdap; Shingles series, PNA, RSV         Screening, Brief Intervention, and Referral to Treatment (SBIRT)    Screening  Typical number of drinks in a day: 0  Typical number of drinks in a week: 0  Interpretation: Low risk drinking behavior.    AUDIT-C Screenin) How often did you have a drink containing alcohol in the past year? monthly or less  2) How many drinks did you have on a typical day when you were drinking in the past year? 0  3) How often did you have 6 or more drinks on one occasion in the past year? less than monthly    AUDIT-C Score: 2  Interpretation: Score 0-2 (female): Negative screen for alcohol misuse    Single Item Drug  "Screening:  How often have you used an illegal drug (including marijuana) or a prescription medication for non-medical reasons in the past year? never    Single Item Drug Screen Score: 0  Interpretation: Negative screen for possible drug use disorder    Other Counseling Topics:   Car/seat belt/driving safety, skin self-exam, sunscreen and regular weightbearing exercise and calcium and vitamin D intake.     Social Drivers of Health     Food Insecurity: No Food Insecurity (1/9/2025)    Hunger Vital Sign     Worried About Running Out of Food in the Last Year: Never true     Ran Out of Food in the Last Year: Never true   Transportation Needs: No Transportation Needs (1/9/2025)    PRAPARE - Transportation     Lack of Transportation (Medical): No     Lack of Transportation (Non-Medical): No   Housing Stability: Low Risk  (1/9/2025)    Housing Stability Vital Sign     Unable to Pay for Housing in the Last Year: No     Number of Times Moved in the Last Year: 0     Homeless in the Last Year: No   Utilities: Not At Risk (1/9/2025)    [facility] Utilities     Threatened with loss of utilities: No     No results found.    Objective   /90   Pulse 82   Temp (!) 97.1 °F (36.2 °C)   Ht 5' 2.6\" (1.59 m)   Wt 65.5 kg (144 lb 6.4 oz)   SpO2 99%   BMI 25.91 kg/m²     Physical Exam  Vitals and nursing note reviewed.   Constitutional:       General: She is not in acute distress.     Appearance: Normal appearance. She is well-developed and well-groomed. She is not ill-appearing.   Neck:      Thyroid: No thyromegaly.      Vascular: No carotid bruit.   Cardiovascular:      Rate and Rhythm: Normal rate and regular rhythm.      Pulses:           Posterior tibial pulses are 2+ on the right side and 2+ on the left side.      Heart sounds: Normal heart sounds.   Pulmonary:      Effort: Pulmonary effort is normal. No respiratory distress.      Breath sounds: Normal breath sounds and air entry.   Musculoskeletal:      Right lower leg: No " edema.      Left lower leg: No edema.   Skin:     General: Skin is warm and dry.      Comments: Reports occasional spot on abdomen  Not currently there  Has been using topical herbal ointment  She was advised to follow up if returns or with any concerning skin findings   Neurological:      General: No focal deficit present.      Mental Status: She is alert and oriented to person, place, and time.   Psychiatric:         Attention and Perception: Attention normal.         Mood and Affect: Mood normal.         Behavior: Behavior normal.         Thought Content: Thought content normal.         Judgment: Judgment normal.

## 2025-01-16 NOTE — PATIENT INSTRUCTIONS
Medicare Preventive Visit Patient Instructions  Thank you for completing your Welcome to Medicare Visit or Medicare Annual Wellness Visit today. Your next wellness visit will be due in one year (1/17/2026).  The screening/preventive services that you may require over the next 5-10 years are detailed below. Some tests may not apply to you based off risk factors and/or age. Screening tests ordered at today's visit but not completed yet may show as past due. Also, please note that scanned in results may not display below.  Preventive Screenings:  Service Recommendations Previous Testing/Comments   Colorectal Cancer Screening  * Colonoscopy    * Fecal Occult Blood Test (FOBT)/Fecal Immunochemical Test (FIT)  * Fecal DNA/Cologuard Test  * Flexible Sigmoidoscopy Age: 45-75 years old   Colonoscopy: every 10 years (may be performed more frequently if at higher risk)  OR  FOBT/FIT: every 1 year  OR  Cologuard: every 3 years  OR  Sigmoidoscopy: every 5 years  Screening may be recommended earlier than age 45 if at higher risk for colorectal cancer. Also, an individualized decision between you and your healthcare provider will decide whether screening between the ages of 76-85 would be appropriate. Colonoscopy: Not on file  FOBT/FIT: Not on file  Cologuard: Not on file  Sigmoidoscopy: Not on file          Breast Cancer Screening Age: 40+ years old  Frequency: every 1-2 years  Not required if history of left and right mastectomy Mammogram: Not on file        Cervical Cancer Screening Between the ages of 21-29, pap smear recommended once every 3 years.   Between the ages of 30-65, can perform pap smear with HPV co-testing every 5 years.   Recommendations may differ for women with a history of total hysterectomy, cervical cancer, or abnormal pap smears in past. Pap Smear: Not on file    Screening Not Indicated   Hepatitis C Screening Once for adults born between 1945 and 1965  More frequently in patients at high risk for Hepatitis  C Hep C Antibody: Not on file        Diabetes Screening 1-2 times per year if you're at risk for diabetes or have pre-diabetes Fasting glucose: 113 mg/dL (7/19/2023)  A1C: No results in last 5 years (No results in last 5 years)      Cholesterol Screening Once every 5 years if you don't have a lipid disorder. May order more often based on risk factors. Lipid panel: Not on file          Other Preventive Screenings Covered by Medicare:  Abdominal Aortic Aneurysm (AAA) Screening: covered once if your at risk. You're considered to be at risk if you have a family history of AAA.  Lung Cancer Screening: covers low dose CT scan once per year if you meet all of the following conditions: (1) Age 55-77; (2) No signs or symptoms of lung cancer; (3) Current smoker or have quit smoking within the last 15 years; (4) You have a tobacco smoking history of at least 20 pack years (packs per day multiplied by number of years you smoked); (5) You get a written order from a healthcare provider.  Glaucoma Screening: covered annually if you're considered high risk: (1) You have diabetes OR (2) Family history of glaucoma OR (3)  aged 50 and older OR (4)  American aged 65 and older  Osteoporosis Screening: covered every 2 years if you meet one of the following conditions: (1) You're estrogen deficient and at risk for osteoporosis based off medical history and other findings; (2) Have a vertebral abnormality; (3) On glucocorticoid therapy for more than 3 months; (4) Have primary hyperparathyroidism; (5) On osteoporosis medications and need to assess response to drug therapy.   Last bone density test (DXA Scan): Not on file.  HIV Screening: covered annually if you're between the age of 15-65. Also covered annually if you are younger than 15 and older than 65 with risk factors for HIV infection. For pregnant patients, it is covered up to 3 times per pregnancy.    Immunizations:  Immunization Recommendations   Influenza  Vaccine Annual influenza vaccination during flu season is recommended for all persons aged >= 6 months who do not have contraindications   Pneumococcal Vaccine   * Pneumococcal conjugate vaccine = PCV13 (Prevnar 13), PCV15 (Vaxneuvance), PCV20 (Prevnar 20)  * Pneumococcal polysaccharide vaccine = PPSV23 (Pneumovax) Adults 19-63 yo with certain risk factors or if 65+ yo  If never received any pneumonia vaccine: recommend Prevnar 20 (PCV20)  Give PCV20 if previously received 1 dose of PCV13 or PPSV23   Hepatitis B Vaccine 3 dose series if at intermediate or high risk (ex: diabetes, end stage renal disease, liver disease)   Respiratory syncytial virus (RSV) Vaccine - COVERED BY MEDICARE PART D  * RSVPreF3 (Arexvy) CDC recommends that adults 60 years of age and older may receive a single dose of RSV vaccine using shared clinical decision-making (SCDM)   Tetanus (Td) Vaccine - COST NOT COVERED BY MEDICARE PART B Following completion of primary series, a booster dose should be given every 10 years to maintain immunity against tetanus. Td may also be given as tetanus wound prophylaxis.   Tdap Vaccine - COST NOT COVERED BY MEDICARE PART B Recommended at least once for all adults. For pregnant patients, recommended with each pregnancy.   Shingles Vaccine (Shingrix) - COST NOT COVERED BY MEDICARE PART B  2 shot series recommended in those 19 years and older who have or will have weakened immune systems or those 50 years and older     Health Maintenance Due:      Topic Date Due   • Hepatitis C Screening  Never done   • Breast Cancer Screening: Mammogram  Never done   • Colorectal Cancer Screening  Never done     Immunizations Due:      Topic Date Due   • Pneumococcal Vaccine: 65+ Years (1 of 1 - PCV) Never done   • Influenza Vaccine (1) 09/01/2024   • COVID-19 Vaccine (1 - 2024-25 season) Never done     Advance Directives   What are advance directives?  Advance directives are legal documents that state your wishes and plans  for medical care. These plans are made ahead of time in case you lose your ability to make decisions for yourself. Advance directives can apply to any medical decision, such as the treatments you want, and if you want to donate organs.   What are the types of advance directives?  There are many types of advance directives, and each state has rules about how to use them. You may choose a combination of any of the following:  Living will:  This is a written record of the treatment you want. You can also choose which treatments you do not want, which to limit, and which to stop at a certain time. This includes surgery, medicine, IV fluid, and tube feedings.   Durable power of  for healthcare (DPAHC):  This is a written record that states who you want to make healthcare choices for you when you are unable to make them for yourself. This person, called a proxy, is usually a family member or a friend. You may choose more than 1 proxy.  Do not resuscitate (DNR) order:  A DNR order is used in case your heart stops beating or you stop breathing. It is a request not to have certain forms of treatment, such as CPR. A DNR order may be included in other types of advance directives.  Medical directive:  This covers the care that you want if you are in a coma, near death, or unable to make decisions for yourself. You can list the treatments you want for each condition. Treatment may include pain medicine, surgery, blood transfusions, dialysis, IV or tube feedings, and a ventilator (breathing machine).  Values history:  This document has questions about your views, beliefs, and how you feel and think about life. This information can help others choose the care that you would choose.  Why are advance directives important?  An advance directive helps you control your care. Although spoken wishes may be used, it is better to have your wishes written down. Spoken wishes can be misunderstood, or not followed. Treatments may be  given even if you do not want them. An advance directive may make it easier for your family to make difficult choices about your care.   Weight Management   Why it is important to manage your weight:  Being overweight increases your risk of health conditions such as heart disease, high blood pressure, type 2 diabetes, and certain types of cancer. It can also increase your risk for osteoarthritis, sleep apnea, and other respiratory problems. Aim for a slow, steady weight loss. Even a small amount of weight loss can lower your risk of health problems.  How to lose weight safely:  A safe and healthy way to lose weight is to eat fewer calories and get regular exercise. You can lose up about 1 pound a week by decreasing the number of calories you eat by 500 calories each day.   Healthy meal plan for weight management:  A healthy meal plan includes a variety of foods, contains fewer calories, and helps you stay healthy. A healthy meal plan includes the following:  Eat whole-grain foods more often.  A healthy meal plan should contain fiber. Fiber is the part of grains, fruits, and vegetables that is not broken down by your body. Whole-grain foods are healthy and provide extra fiber in your diet. Some examples of whole-grain foods are whole-wheat breads and pastas, oatmeal, brown rice, and bulgur.  Eat a variety of vegetables every day.  Include dark, leafy greens such as spinach, kale, jay greens, and mustard greens. Eat yellow and orange vegetables such as carrots, sweet potatoes, and winter squash.   Eat a variety of fruits every day.  Choose fresh or canned fruit (canned in its own juice or light syrup) instead of juice. Fruit juice has very little or no fiber.  Eat low-fat dairy foods.  Drink fat-free (skim) milk or 1% milk. Eat fat-free yogurt and low-fat cottage cheese. Try low-fat cheeses such as mozzarella and other reduced-fat cheeses.  Choose meat and other protein foods that are low in fat.  Choose beans or  other legumes such as split peas or lentils. Choose fish, skinless poultry (chicken or turkey), or lean cuts of red meat (beef or pork). Before you cook meat or poultry, cut off any visible fat.   Use less fat and oil.  Try baking foods instead of frying them. Add less fat, such as margarine, sour cream, regular salad dressing and mayonnaise to foods. Eat fewer high-fat foods. Some examples of high-fat foods include french fries, doughnuts, ice cream, and cakes.  Eat fewer sweets.  Limit foods and drinks that are high in sugar. This includes candy, cookies, regular soda, and sweetened drinks.  Exercise:  Exercise at least 30 minutes per day on most days of the week. Some examples of exercise include walking, biking, dancing, and swimming. You can also fit in more physical activity by taking the stairs instead of the elevator or parking farther away from stores. Ask your healthcare provider about the best exercise plan for you.      © Copyright [employer] 2018 Information is for End User's use only and may not be sold, redistributed or otherwise used for commercial purposes. All illustrations and images included in CareNotes® are the copyrighted property of [employer] or [employer]

## (undated) DEVICE — DISPOSABLE OR TOWEL: Brand: CARDINAL HEALTH

## (undated) DEVICE — GAUZE SPONGES,16 PLY: Brand: CURITY

## (undated) DEVICE — 3M™ IOBAN™ 2 ANTIMICROBIAL INCISE DRAPE 6650EZ: Brand: IOBAN™ 2

## (undated) DEVICE — PADDING CAST 6IN COTTON STRL

## (undated) DEVICE — CUFF TOURNIQUET 30 X 4 IN QUICK CONNECT DISP 1BLA

## (undated) DEVICE — BETHLEHEM UNIVERSAL  ARTHRO PK: Brand: CARDINAL HEALTH

## (undated) DEVICE — 4-PORT MANIFOLD: Brand: NEPTUNE 2

## (undated) DEVICE — DRAPE SHEET THREE QUARTER

## (undated) DEVICE — INTENDED FOR TISSUE SEPARATION, AND OTHER PROCEDURES THAT REQUIRE A SHARP SURGICAL BLADE TO PUNCTURE OR CUT.: Brand: BARD-PARKER ® SAFETYLOCK CARBON RIB-BACK BLADES

## (undated) DEVICE — OCCLUSIVE GAUZE STRIP,3% BISMUTH TRIBROMOPHENATE IN PETROLATUM BLEND: Brand: XEROFORM

## (undated) DEVICE — STERILE POLYISOPRENE POWDER-FREE SURGICAL GLOVES WITH EMOLLIENT COATING: Brand: PROTEXIS

## (undated) DEVICE — SCD SEQUENTIAL COMPRESSION COMFORT SLEEVE MEDIUM KNEE LENGTH: Brand: KENDALL SCD

## (undated) DEVICE — 3M™ STERI-DRAPE™ U-DRAPE 1015: Brand: STERI-DRAPE™

## (undated) DEVICE — GLOVE SRG LF STRL BGL SKNSNS 6.5 PF

## (undated) DEVICE — BLADE SHAVER DISSECTOR  4MM 13CM CRV COOLCUT

## (undated) DEVICE — EXTREMITY DRAPE W/ARMBOARD COVERS: Brand: CONVERTORS

## (undated) DEVICE — SYRINGE 30ML LL

## (undated) DEVICE — ABDOMINAL PAD: Brand: DERMACEA

## (undated) DEVICE — SUT MONOCRYL 4-0 PS-2 27 IN Y426H

## (undated) DEVICE — ARTHROSCOPY FLOOR MAT

## (undated) DEVICE — WEBRIL 6 IN UNSTERILE

## (undated) DEVICE — GLOVE INDICATOR PI UNDERGLOVE SZ 7 BLUE

## (undated) DEVICE — LIGHT GLOVE GREEN

## (undated) DEVICE — BOWL: 16OZ PEELPOUCH 75/CS 16/PLT: Brand: MEDEGEN MEDICAL PRODUCTS, LLC

## (undated) DEVICE — TUBING ARTHROSCOPIC WAVE  MAIN PUMP

## (undated) DEVICE — STIRRUP STRAP ADULT DISP

## (undated) DEVICE — GLOVE SRG LF STRL BGL SKNSNS 8.5 PF

## (undated) DEVICE — CHLORAPREP HI-LITE 26ML ORANGE

## (undated) DEVICE — STOCKINETTE,IMPERVIOUS,12X48,STERILE: Brand: MEDLINE

## (undated) DEVICE — 3M™ STERI-STRIP™ REINFORCED ADHESIVE SKIN CLOSURES, R1547, 1/2 IN X 4 IN (12 MM X 100 MM), 6 STRIPS/ENVELOPE: Brand: 3M™ STERI-STRIP™

## (undated) DEVICE — NEEDLE BLUNT 18 G X 1 1/2IN

## (undated) DEVICE — ACE WRAP 6 IN UNSTERILE